# Patient Record
Sex: MALE | Race: WHITE | Employment: OTHER | URBAN - METROPOLITAN AREA
[De-identification: names, ages, dates, MRNs, and addresses within clinical notes are randomized per-mention and may not be internally consistent; named-entity substitution may affect disease eponyms.]

---

## 2022-03-06 ENCOUNTER — HOSPITAL ENCOUNTER (INPATIENT)
Age: 82
LOS: 9 days | Discharge: HOME HEALTH | DRG: 189 | End: 2022-03-15
Attending: EMERGENCY MEDICINE | Admitting: INTERNAL MEDICINE
Payer: MEDICARE

## 2022-03-06 ENCOUNTER — APPOINTMENT (OUTPATIENT)
Dept: GENERAL RADIOLOGY | Age: 82
DRG: 189 | End: 2022-03-06
Attending: EMERGENCY MEDICINE
Payer: MEDICARE

## 2022-03-06 ENCOUNTER — APPOINTMENT (OUTPATIENT)
Dept: CT IMAGING | Age: 82
DRG: 189 | End: 2022-03-06
Attending: EMERGENCY MEDICINE
Payer: MEDICARE

## 2022-03-06 DIAGNOSIS — R41.0 DELIRIUM: ICD-10-CM

## 2022-03-06 DIAGNOSIS — D72.829 LEUKOCYTOSIS, UNSPECIFIED TYPE: ICD-10-CM

## 2022-03-06 DIAGNOSIS — Z51.5 PALLIATIVE CARE BY SPECIALIST: ICD-10-CM

## 2022-03-06 DIAGNOSIS — R06.02 SHORTNESS OF BREATH: ICD-10-CM

## 2022-03-06 DIAGNOSIS — N17.9 AKI (ACUTE KIDNEY INJURY) (HCC): Primary | ICD-10-CM

## 2022-03-06 DIAGNOSIS — Z71.89 ADVANCE CARE PLANNING: ICD-10-CM

## 2022-03-06 PROBLEM — J96.01 ACUTE RESPIRATORY FAILURE WITH HYPOXIA (HCC): Status: ACTIVE | Noted: 2022-03-06

## 2022-03-06 PROBLEM — R65.10 SIRS (SYSTEMIC INFLAMMATORY RESPONSE SYNDROME) (HCC): Status: ACTIVE | Noted: 2022-03-06

## 2022-03-06 LAB
ALBUMIN SERPL-MCNC: 3.2 G/DL (ref 3.5–5)
ALBUMIN/GLOB SERPL: 0.6 {RATIO} (ref 1.1–2.2)
ALP SERPL-CCNC: 116 U/L (ref 45–117)
ALT SERPL-CCNC: 28 U/L (ref 12–78)
ANION GAP SERPL CALC-SCNC: ABNORMAL MMOL/L (ref 5–15)
APPEARANCE UR: CLEAR
AST SERPL-CCNC: 15 U/L (ref 15–37)
BACTERIA URNS QL MICRO: NEGATIVE /HPF
BASOPHILS # BLD: 0.1 K/UL (ref 0–0.1)
BASOPHILS NFR BLD: 0 % (ref 0–1)
BILIRUB SERPL-MCNC: 0.3 MG/DL (ref 0.2–1)
BILIRUB UR QL: NEGATIVE
BUN SERPL-MCNC: 29 MG/DL (ref 6–20)
BUN/CREAT SERPL: 21 (ref 12–20)
CALCIUM SERPL-MCNC: 9.9 MG/DL (ref 8.5–10.1)
CHLORIDE SERPL-SCNC: 101 MMOL/L (ref 97–108)
CO2 SERPL-SCNC: 37 MMOL/L (ref 21–32)
COLOR UR: ABNORMAL
COMMENT, HOLDF: NORMAL
COVID-19 RAPID TEST, COVR: NOT DETECTED
CREAT SERPL-MCNC: 1.38 MG/DL (ref 0.7–1.3)
DIFFERENTIAL METHOD BLD: ABNORMAL
EOSINOPHIL # BLD: 0.3 K/UL (ref 0–0.4)
EOSINOPHIL NFR BLD: 2 % (ref 0–7)
EPITH CASTS URNS QL MICRO: ABNORMAL /LPF
ERYTHROCYTE [DISTWIDTH] IN BLOOD BY AUTOMATED COUNT: 13.1 % (ref 11.5–14.5)
FLUAV AG NPH QL IA: NEGATIVE
FLUBV AG NOSE QL IA: NEGATIVE
GLOBULIN SER CALC-MCNC: 5.1 G/DL (ref 2–4)
GLUCOSE SERPL-MCNC: 142 MG/DL (ref 65–100)
GLUCOSE UR STRIP.AUTO-MCNC: NEGATIVE MG/DL
HCT VFR BLD AUTO: 45.8 % (ref 36.6–50.3)
HGB BLD-MCNC: 14.1 G/DL (ref 12.1–17)
HGB UR QL STRIP: NEGATIVE
IMM GRANULOCYTES # BLD AUTO: 0.1 K/UL (ref 0–0.04)
IMM GRANULOCYTES NFR BLD AUTO: 1 % (ref 0–0.5)
KETONES UR QL STRIP.AUTO: NEGATIVE MG/DL
LACTATE SERPL-SCNC: 0.8 MMOL/L (ref 0.4–2)
LEUKOCYTE ESTERASE UR QL STRIP.AUTO: ABNORMAL
LYMPHOCYTES # BLD: 1.8 K/UL (ref 0.8–3.5)
LYMPHOCYTES NFR BLD: 11 % (ref 12–49)
MCH RBC QN AUTO: 30.7 PG (ref 26–34)
MCHC RBC AUTO-ENTMCNC: 30.8 G/DL (ref 30–36.5)
MCV RBC AUTO: 99.6 FL (ref 80–99)
MONOCYTES # BLD: 1.4 K/UL (ref 0–1)
MONOCYTES NFR BLD: 9 % (ref 5–13)
NEUTS SEG # BLD: 12.9 K/UL (ref 1.8–8)
NEUTS SEG NFR BLD: 77 % (ref 32–75)
NITRITE UR QL STRIP.AUTO: NEGATIVE
NRBC # BLD: 0 K/UL (ref 0–0.01)
NRBC BLD-RTO: 0 PER 100 WBC
PH UR STRIP: 5 [PH] (ref 5–8)
PLATELET # BLD AUTO: 299 K/UL (ref 150–400)
PMV BLD AUTO: 9.3 FL (ref 8.9–12.9)
POTASSIUM SERPL-SCNC: 5 MMOL/L (ref 3.5–5.1)
PROCALCITONIN SERPL-MCNC: 0.19 NG/ML
PROT SERPL-MCNC: 8.3 G/DL (ref 6.4–8.2)
PROT UR STRIP-MCNC: 30 MG/DL
RBC # BLD AUTO: 4.6 M/UL (ref 4.1–5.7)
RBC #/AREA URNS HPF: ABNORMAL /HPF (ref 0–5)
SAMPLES BEING HELD,HOLD: NORMAL
SODIUM SERPL-SCNC: 137 MMOL/L (ref 136–145)
SOURCE, COVRS: NORMAL
SP GR UR REFRACTOMETRY: 1.03 (ref 1–1.03)
SPERM URNS QL MICRO: PRESENT
TROPONIN-HIGH SENSITIVITY: 10 NG/L (ref 0–76)
UR CULT HOLD, URHOLD: NORMAL
UROBILINOGEN UR QL STRIP.AUTO: 0.2 EU/DL (ref 0.2–1)
WBC # BLD AUTO: 16.5 K/UL (ref 4.1–11.1)
WBC URNS QL MICRO: ABNORMAL /HPF (ref 0–4)

## 2022-03-06 PROCEDURE — 87804 INFLUENZA ASSAY W/OPTIC: CPT

## 2022-03-06 PROCEDURE — 84484 ASSAY OF TROPONIN QUANT: CPT

## 2022-03-06 PROCEDURE — 99285 EMERGENCY DEPT VISIT HI MDM: CPT

## 2022-03-06 PROCEDURE — 71045 X-RAY EXAM CHEST 1 VIEW: CPT

## 2022-03-06 PROCEDURE — 87635 SARS-COV-2 COVID-19 AMP PRB: CPT

## 2022-03-06 PROCEDURE — 93005 ELECTROCARDIOGRAM TRACING: CPT

## 2022-03-06 PROCEDURE — 80053 COMPREHEN METABOLIC PANEL: CPT

## 2022-03-06 PROCEDURE — 74011250636 HC RX REV CODE- 250/636: Performed by: INTERNAL MEDICINE

## 2022-03-06 PROCEDURE — 83605 ASSAY OF LACTIC ACID: CPT

## 2022-03-06 PROCEDURE — 65270000029 HC RM PRIVATE

## 2022-03-06 PROCEDURE — 74011250637 HC RX REV CODE- 250/637: Performed by: FAMILY MEDICINE

## 2022-03-06 PROCEDURE — 81001 URINALYSIS AUTO W/SCOPE: CPT

## 2022-03-06 PROCEDURE — 36415 COLL VENOUS BLD VENIPUNCTURE: CPT

## 2022-03-06 PROCEDURE — 74011000250 HC RX REV CODE- 250: Performed by: INTERNAL MEDICINE

## 2022-03-06 PROCEDURE — 70450 CT HEAD/BRAIN W/O DYE: CPT

## 2022-03-06 PROCEDURE — 87040 BLOOD CULTURE FOR BACTERIA: CPT

## 2022-03-06 PROCEDURE — 85025 COMPLETE CBC W/AUTO DIFF WBC: CPT

## 2022-03-06 PROCEDURE — 84145 PROCALCITONIN (PCT): CPT

## 2022-03-06 RX ORDER — ALBUTEROL SULFATE 90 UG/1
2 AEROSOL, METERED RESPIRATORY (INHALATION)
COMMUNITY

## 2022-03-06 RX ORDER — AMLODIPINE BESYLATE 10 MG/1
10 TABLET ORAL
COMMUNITY

## 2022-03-06 RX ORDER — POLYETHYLENE GLYCOL 3350 17 G/17G
17 POWDER, FOR SOLUTION ORAL DAILY PRN
Status: DISCONTINUED | OUTPATIENT
Start: 2022-03-06 | End: 2022-03-15 | Stop reason: HOSPADM

## 2022-03-06 RX ORDER — SIMVASTATIN 20 MG/1
20 TABLET, FILM COATED ORAL
COMMUNITY

## 2022-03-06 RX ORDER — LEVOCETIRIZINE DIHYDROCHLORIDE 5 MG/1
5 TABLET, FILM COATED ORAL
COMMUNITY

## 2022-03-06 RX ORDER — SODIUM CHLORIDE 0.9 % (FLUSH) 0.9 %
5-40 SYRINGE (ML) INJECTION EVERY 8 HOURS
Status: DISCONTINUED | OUTPATIENT
Start: 2022-03-06 | End: 2022-03-15 | Stop reason: HOSPADM

## 2022-03-06 RX ORDER — SODIUM CHLORIDE 0.9 % (FLUSH) 0.9 %
5-40 SYRINGE (ML) INJECTION AS NEEDED
Status: DISCONTINUED | OUTPATIENT
Start: 2022-03-06 | End: 2022-03-15 | Stop reason: HOSPADM

## 2022-03-06 RX ORDER — HEPARIN SODIUM 5000 [USP'U]/ML
5000 INJECTION, SOLUTION INTRAVENOUS; SUBCUTANEOUS EVERY 8 HOURS
Status: DISCONTINUED | OUTPATIENT
Start: 2022-03-06 | End: 2022-03-15 | Stop reason: HOSPADM

## 2022-03-06 RX ORDER — BENAZEPRIL HYDROCHLORIDE 20 MG/1
20 TABLET ORAL DAILY
COMMUNITY
End: 2022-03-15

## 2022-03-06 RX ORDER — DICLOFENAC SODIUM 75 MG/1
75 TABLET, DELAYED RELEASE ORAL
COMMUNITY
End: 2022-03-15

## 2022-03-06 RX ORDER — ONDANSETRON 2 MG/ML
4 INJECTION INTRAMUSCULAR; INTRAVENOUS
Status: DISCONTINUED | OUTPATIENT
Start: 2022-03-06 | End: 2022-03-15 | Stop reason: HOSPADM

## 2022-03-06 RX ORDER — GABAPENTIN 300 MG/1
300 CAPSULE ORAL 2 TIMES DAILY
COMMUNITY

## 2022-03-06 RX ORDER — ACETAMINOPHEN 650 MG/1
650 SUPPOSITORY RECTAL
Status: DISCONTINUED | OUTPATIENT
Start: 2022-03-06 | End: 2022-03-15 | Stop reason: HOSPADM

## 2022-03-06 RX ORDER — ACETAMINOPHEN 325 MG/1
650 TABLET ORAL
Status: DISCONTINUED | OUTPATIENT
Start: 2022-03-06 | End: 2022-03-15 | Stop reason: HOSPADM

## 2022-03-06 RX ORDER — LANOLIN ALCOHOL/MO/W.PET/CERES
3 CREAM (GRAM) TOPICAL
Status: DISCONTINUED | OUTPATIENT
Start: 2022-03-06 | End: 2022-03-15 | Stop reason: HOSPADM

## 2022-03-06 RX ORDER — ONDANSETRON 4 MG/1
4 TABLET, ORALLY DISINTEGRATING ORAL
Status: DISCONTINUED | OUTPATIENT
Start: 2022-03-06 | End: 2022-03-15 | Stop reason: HOSPADM

## 2022-03-06 RX ADMIN — Medication 3 MG: at 23:11

## 2022-03-06 RX ADMIN — HEPARIN SODIUM 5000 UNITS: 5000 INJECTION INTRAVENOUS; SUBCUTANEOUS at 22:14

## 2022-03-06 RX ADMIN — SODIUM CHLORIDE, PRESERVATIVE FREE 10 ML: 5 INJECTION INTRAVENOUS at 22:14

## 2022-03-06 NOTE — H&P
Barrera Wu [facility] 79  [address]  [phone]    Admission History and Physical      NAME:  Sandy Medeiros   :   1940   MRN:  [id number]     PCP:  No primary care provider on file. Date/Time of service:  3/6/2022  6:20 PM        Subjective:     CHIEF COMPLAINT: ataxia, confusion     HISTORY OF PRESENT ILLNESS:     Mr. Renetta Schreiber is a 80 y.o. male who is admitted with confusion, multiple falls earlier today. Wife and son also present during encounter. [facility], states last xray outpatient no change from prior. Today noted to be hypoxic on second call to fire department when family insisted he come to hospital (he refused after first call to fire department). His O2 sats drop into 70s despite 6L NC in place during encounter, improve with deep inspiration and breaks from talking. He normally wears 3-4L usually in the morning but not all day. He initially insists on leaving. I discussed at length in presence of his family the risks of him going home AMA. After long discussion with myself and family, he is agreeable to stay. No Known Allergies    Prior to Admission medications    Not on File       Past Medical History: HTN, GERD     No past surgical history on file. Social History     Tobacco Use    Smoking status: Not on file    Smokeless tobacco: Not on file   Substance Use Topics    Alcohol use: Not on file        No family history on file.      Review of Systems:  (bold if positive, if negative)    Gen:  Eyes:  ENT:  CVS:  Pulm:  dyspneaGI:  :  MS:  Skin:  Psych:  Endo:  Hem:  Renal:  Neuro:          Objective:      VITALS:    Vital signs reviewed; most recent are:    Visit Vitals  BP (!) 146/78 (BP 1 Location: Right upper arm, BP Patient Position: At rest)   Pulse (!) 109   Temp 99.1 °F (37.3 °C)   Resp 29   Ht 6' 1\" (1.854 m)   Wt 102.7 kg (226 lb 8 oz)   SpO2 94%   BMI 29.88 kg/m²     SpO2 Readings from Last 6 Encounters:   03/06/22 94%    O2 Flow Rate (L/min): 5 l/min   No intake or output data in the 24 hours ending 03/06/22 1820     Exam:     Physical Exam:    Gen:  Well-developed, well-nourished, in no acute distress  HEENT:  Pink conjunctivae, PERRL, hearing intact to voice, moist mucous membranes  Neck:  Supple, without masses, thyroid non-tender  Resp:  No accessory muscle use, clear breath sounds without wheezes rales or rhonchi  Card:  No murmurs, normal S1, S2 without thrills, bruits or peripheral edema  Abd:  Soft, non-tender, non-distended, normoactive bowel sounds are present, no palpable organomegaly and +LLQ hernia  Lymph:  No cervical adenopathy  Musc:  No cyanosis or clubbing  Skin:  No rashes or ulcers, skin turgor is good  Neuro:  Nonfocal, moves extremities, follows commands  Psych:  Alert with good insight. Oriented to person, place, and time      Labs:    Recent Labs     03/06/22  1638   WBC 16.5*   HGB 14.1   HCT 45.8        Recent Labs     03/06/22  1638      K 5.0      CO2 37*   *   BUN 29*   CREA 1.38*   CA 9.9   ALB 3.2*   TBILI 0.3   ALT 28          Assessment/Plan:       1. Acute metabolic encephalopathy - CT head without acute abnormality; resolved suspect sec to hypoxia  2. Acute hypoxic respiratory failure - cxr with R hemidiaphragm elevation, no prior imaging for comparison; c/s pulm due to known L collapsed lung, ?progression; c/s speech to r/o risk of aspiration  3. Ataxia - PT/OT, check vit b12, folate, TSH  4. Elevated creatinine - no baseline for comparison, IVF, trend; suspect OJ sec to dehydration  5. Dehydration - spec grav 1.029 on ua, IVF  6. SIRS - covid, flu neg; leukocytosis, tachycardic, tachypnic; cxr neg for pna, check ucx; poss reactive from hypoxia, bcx pending, lactic acid and procal wnl  7. Chronic hernia  8. HTN  9. GERD  10. Overweight - BMI 29.88  11. VTE prophylaxis - heparin  12.  Dispo - admit to remote telemetry    Partial code status order placed in [facility]. OK for chest compressions, medications. Patient declines intubation if necessary.     Attending Physician: Nakul Maldonado,

## 2022-03-06 NOTE — ED TRIAGE NOTES
Pt arrives to ED via EMS from home where family reports pt has been more confused today. Per EMS pt has GCS 15 with periodic confusion. Pt also found to be satting at 79% on baseline 4 LO2 due to left side collapsed lung. Pt placed on 6L O2, satting at 91%. Pt non ambulatory.

## 2022-03-06 NOTE — ED PROVIDER NOTES
Sri Ramirez is a 81 yo M who arrives to the ED by EMS for confusion and decreased O2 saturation. EMS reports that patient has a history of left side collapsed lung and is on 4 L home O2 at baseline. Was called earlier today for lift assist and then this afternoon for increased confusion. Was found to have O2 saturation 79% on 4L on their arrival.  They increased to 6L and O2 sat now 91%. Patient is nonambulatory at baseline. Patient denies chest pain. No past medical history on file. No past surgical history on file. No family history on file. Social History     Socioeconomic History    Marital status: Not on file     Spouse name: Not on file    Number of children: Not on file    Years of education: Not on file    Highest education level: Not on file   Occupational History    Not on file   Tobacco Use    Smoking status: Not on file    Smokeless tobacco: Not on file   Substance and Sexual Activity    Alcohol use: Not on file    Drug use: Not on file    Sexual activity: Not on file   Other Topics Concern    Not on file   Social History Narrative    Not on file     Social Determinants of Health     Financial Resource Strain:     Difficulty of Paying Living Expenses: Not on file   Food Insecurity:     Worried About Running Out of Food in the Last Year: Not on file    Mary of Food in the Last Year: Not on file   Transportation Needs:     Lack of Transportation (Medical): Not on file    Lack of Transportation (Non-Medical):  Not on file   Physical Activity:     Days of Exercise per Week: Not on file    Minutes of Exercise per Session: Not on file   Stress:     Feeling of Stress : Not on file   Social Connections:     Frequency of Communication with Friends and Family: Not on file    Frequency of Social Gatherings with Friends and Family: Not on file    Attends Advent Services: Not on file    Active Member of Clubs or Organizations: Not on file    Attends Club or Organization Meetings: Not on file    Marital Status: Not on file   Intimate Partner Violence:     Fear of Current or Ex-Partner: Not on file    Emotionally Abused: Not on file    Physically Abused: Not on file    Sexually Abused: Not on file   Housing Stability:     Unable to Pay for Housing in the Last Year: Not on file    Number of [place] in the Last Year: Not on file    Unstable Housing in the Last Year: Not on file         ALLERGIES: Patient has no allergy information on record. Review of Systems   Unable to perform ROS: Mental status change       Vitals:    03/06/22 1626 03/06/22 1634   BP: (!) 146/78    Pulse: (!) 109    Resp: 29    Temp: 99.1 °F (37.3 °C)    SpO2: 95% 94%   Weight: 102.7 kg (226 lb 8 oz)    Height: 6' 1\" (1.854 m)             Physical Exam  Vitals and nursing note reviewed. Constitutional:       General: He is not in acute distress. Appearance: He is well-developed. HENT:      Head: Normocephalic and atraumatic. Eyes:      Conjunctiva/sclera: Conjunctivae normal.   Neck:      Trachea: Phonation normal.   Cardiovascular:      Rate and Rhythm: Normal rate. Pulmonary:      Effort: Pulmonary effort is normal. No respiratory distress. Breath sounds: Rales present. Abdominal:      General: There is no distension. Musculoskeletal:         General: No tenderness. Normal range of motion. Cervical back: Normal range of motion. Skin:     General: Skin is warm and dry. Neurological:      Mental Status: He is alert. He is not disoriented. Motor: No abnormal muscle tone. ED EKG interpretation:  Rhythm: sinus tachycardia; and regular . Rate (approx.): 108; Axis: normal; P wave: normal; QRS interval: normal ; ST/T wave: normal; Other findings: otherwise normal. This EKG was interpreted by Thomas Fowler MD,ED Provider.     MDM       Perfect Serve Consult for Admission  6:12 PM    ED Room Number: ER16/16  Patient Name and age:  Maria M Simon y.o.  male  Working Diagnosis:   1. OJ (acute kidney injury) ([facility] [address]    2. Leukocytosis, unspecified type    3. Delirium        COVID-19 Suspicion:  no  Sepsis present:  no  Reassessment needed: N/A  Code Status:  Full Code  Readmission: no  Isolation Requirements:  no  Recommended Level of Care:  telemetry  Department:[facility] - [phone]  Other:  Patient brought by EMS. Family report increased confusion and ataxia today. [employer] twice and EMS was called to home earlier for lift assist and patient refused transport at that time. .  On second visit O2 sat in low 80's and patient more confused. Is on 4 L O2 at home. O2 sat no 94%. CXR with right diaphragm elevation but no acute findings. CT head with no acute findings.     Procedures

## 2022-03-07 LAB
ANION GAP SERPL CALC-SCNC: 3 MMOL/L (ref 5–15)
ATRIAL RATE: 108 BPM
BASOPHILS # BLD: 0.1 K/UL (ref 0–0.1)
BASOPHILS NFR BLD: 0 % (ref 0–1)
BUN SERPL-MCNC: 19 MG/DL (ref 6–20)
BUN/CREAT SERPL: 19 (ref 12–20)
CALCIUM SERPL-MCNC: 9.8 MG/DL (ref 8.5–10.1)
CALCULATED P AXIS, ECG09: 64 DEGREES
CALCULATED R AXIS, ECG10: 43 DEGREES
CALCULATED T AXIS, ECG11: 65 DEGREES
CHLORIDE SERPL-SCNC: 103 MMOL/L (ref 97–108)
CO2 SERPL-SCNC: 31 MMOL/L (ref 21–32)
CREAT SERPL-MCNC: 0.98 MG/DL (ref 0.7–1.3)
DIAGNOSIS, 93000: NORMAL
DIFFERENTIAL METHOD BLD: ABNORMAL
EOSINOPHIL # BLD: 0.2 K/UL (ref 0–0.4)
EOSINOPHIL NFR BLD: 1 % (ref 0–7)
ERYTHROCYTE [DISTWIDTH] IN BLOOD BY AUTOMATED COUNT: 13.2 % (ref 11.5–14.5)
FOLATE SERPL-MCNC: 55.2 NG/ML (ref 5–21)
GLUCOSE SERPL-MCNC: 141 MG/DL (ref 65–100)
HCT VFR BLD AUTO: 45.7 % (ref 36.6–50.3)
HGB BLD-MCNC: 14.3 G/DL (ref 12.1–17)
IMM GRANULOCYTES # BLD AUTO: 0.1 K/UL (ref 0–0.04)
IMM GRANULOCYTES NFR BLD AUTO: 0 % (ref 0–0.5)
LYMPHOCYTES # BLD: 1.8 K/UL (ref 0.8–3.5)
LYMPHOCYTES NFR BLD: 11 % (ref 12–49)
MCH RBC QN AUTO: 30.9 PG (ref 26–34)
MCHC RBC AUTO-ENTMCNC: 31.3 G/DL (ref 30–36.5)
MCV RBC AUTO: 98.7 FL (ref 80–99)
MONOCYTES # BLD: 1.2 K/UL (ref 0–1)
MONOCYTES NFR BLD: 8 % (ref 5–13)
NEUTS SEG # BLD: 12.3 K/UL (ref 1.8–8)
NEUTS SEG NFR BLD: 80 % (ref 32–75)
NRBC # BLD: 0 K/UL (ref 0–0.01)
NRBC BLD-RTO: 0 PER 100 WBC
P-R INTERVAL, ECG05: 138 MS
PLATELET # BLD AUTO: 263 K/UL (ref 150–400)
PMV BLD AUTO: 9.3 FL (ref 8.9–12.9)
POTASSIUM SERPL-SCNC: 4.8 MMOL/L (ref 3.5–5.1)
Q-T INTERVAL, ECG07: 314 MS
QRS DURATION, ECG06: 92 MS
QTC CALCULATION (BEZET), ECG08: 420 MS
RBC # BLD AUTO: 4.63 M/UL (ref 4.1–5.7)
SODIUM SERPL-SCNC: 137 MMOL/L (ref 136–145)
TSH SERPL DL<=0.05 MIU/L-ACNC: 0.6 UIU/ML (ref 0.36–3.74)
VENTRICULAR RATE, ECG03: 108 BPM
VIT B12 SERPL-MCNC: 1375 PG/ML (ref 193–986)
WBC # BLD AUTO: 15.5 K/UL (ref 4.1–11.1)

## 2022-03-07 PROCEDURE — 74011250637 HC RX REV CODE- 250/637: Performed by: INTERNAL MEDICINE

## 2022-03-07 PROCEDURE — 74011250636 HC RX REV CODE- 250/636: Performed by: INTERNAL MEDICINE

## 2022-03-07 PROCEDURE — 74011250637 HC RX REV CODE- 250/637: Performed by: FAMILY MEDICINE

## 2022-03-07 PROCEDURE — 97162 PT EVAL MOD COMPLEX 30 MIN: CPT

## 2022-03-07 PROCEDURE — 84443 ASSAY THYROID STIM HORMONE: CPT

## 2022-03-07 PROCEDURE — 97530 THERAPEUTIC ACTIVITIES: CPT

## 2022-03-07 PROCEDURE — 94761 N-INVAS EAR/PLS OXIMETRY MLT: CPT

## 2022-03-07 PROCEDURE — 94640 AIRWAY INHALATION TREATMENT: CPT

## 2022-03-07 PROCEDURE — 92610 EVALUATE SWALLOWING FUNCTION: CPT

## 2022-03-07 PROCEDURE — 94664 DEMO&/EVAL PT USE INHALER: CPT

## 2022-03-07 PROCEDURE — 97116 GAIT TRAINING THERAPY: CPT

## 2022-03-07 PROCEDURE — 74011000250 HC RX REV CODE- 250: Performed by: INTERNAL MEDICINE

## 2022-03-07 PROCEDURE — 65660000000 HC RM CCU STEPDOWN

## 2022-03-07 PROCEDURE — 36415 COLL VENOUS BLD VENIPUNCTURE: CPT

## 2022-03-07 PROCEDURE — 97535 SELF CARE MNGMENT TRAINING: CPT

## 2022-03-07 PROCEDURE — 77010033711 HC HIGH FLOW OXYGEN

## 2022-03-07 PROCEDURE — 82607 VITAMIN B-12: CPT

## 2022-03-07 PROCEDURE — 80048 BASIC METABOLIC PNL TOTAL CA: CPT

## 2022-03-07 PROCEDURE — 85025 COMPLETE CBC W/AUTO DIFF WBC: CPT

## 2022-03-07 PROCEDURE — 77010033678 HC OXYGEN DAILY

## 2022-03-07 PROCEDURE — 82746 ASSAY OF FOLIC ACID SERUM: CPT

## 2022-03-07 PROCEDURE — 2709999900 HC NON-CHARGEABLE SUPPLY

## 2022-03-07 PROCEDURE — 74011636637 HC RX REV CODE- 636/637: Performed by: INTERNAL MEDICINE

## 2022-03-07 PROCEDURE — 97165 OT EVAL LOW COMPLEX 30 MIN: CPT

## 2022-03-07 RX ORDER — IPRATROPIUM BROMIDE AND ALBUTEROL SULFATE 2.5; .5 MG/3ML; MG/3ML
3 SOLUTION RESPIRATORY (INHALATION)
Status: DISCONTINUED | OUTPATIENT
Start: 2022-03-07 | End: 2022-03-08

## 2022-03-07 RX ORDER — HYDRALAZINE HYDROCHLORIDE 20 MG/ML
10 INJECTION INTRAMUSCULAR; INTRAVENOUS
Status: DISCONTINUED | OUTPATIENT
Start: 2022-03-07 | End: 2022-03-15 | Stop reason: HOSPADM

## 2022-03-07 RX ORDER — OXYCODONE HYDROCHLORIDE 5 MG/1
5 TABLET ORAL
Status: DISCONTINUED | OUTPATIENT
Start: 2022-03-07 | End: 2022-03-15 | Stop reason: HOSPADM

## 2022-03-07 RX ORDER — GUAIFENESIN 600 MG/1
600 TABLET, EXTENDED RELEASE ORAL EVERY 12 HOURS
Status: DISCONTINUED | OUTPATIENT
Start: 2022-03-07 | End: 2022-03-15 | Stop reason: HOSPADM

## 2022-03-07 RX ORDER — IPRATROPIUM BROMIDE AND ALBUTEROL SULFATE 2.5; .5 MG/3ML; MG/3ML
3 SOLUTION RESPIRATORY (INHALATION)
Status: DISCONTINUED | OUTPATIENT
Start: 2022-03-07 | End: 2022-03-15 | Stop reason: HOSPADM

## 2022-03-07 RX ORDER — PREDNISONE 20 MG/1
40 TABLET ORAL
Status: COMPLETED | OUTPATIENT
Start: 2022-03-07 | End: 2022-03-10

## 2022-03-07 RX ADMIN — OXYCODONE 5 MG: 5 TABLET ORAL at 19:54

## 2022-03-07 RX ADMIN — HEPARIN SODIUM 5000 UNITS: 5000 INJECTION INTRAVENOUS; SUBCUTANEOUS at 21:55

## 2022-03-07 RX ADMIN — SODIUM CHLORIDE, PRESERVATIVE FREE 10 ML: 5 INJECTION INTRAVENOUS at 08:05

## 2022-03-07 RX ADMIN — SODIUM CHLORIDE, PRESERVATIVE FREE 10 ML: 5 INJECTION INTRAVENOUS at 21:38

## 2022-03-07 RX ADMIN — ONDANSETRON 4 MG: 2 INJECTION INTRAMUSCULAR; INTRAVENOUS at 21:37

## 2022-03-07 RX ADMIN — SODIUM CHLORIDE 1 G: 9 INJECTION INTRAMUSCULAR; INTRAVENOUS; SUBCUTANEOUS at 16:58

## 2022-03-07 RX ADMIN — IPRATROPIUM BROMIDE AND ALBUTEROL SULFATE 3 ML: .5; 3 SOLUTION RESPIRATORY (INHALATION) at 21:14

## 2022-03-07 RX ADMIN — Medication 3 MG: at 21:55

## 2022-03-07 RX ADMIN — ACETAMINOPHEN 650 MG: 325 TABLET ORAL at 00:26

## 2022-03-07 RX ADMIN — HEPARIN SODIUM 5000 UNITS: 5000 INJECTION INTRAVENOUS; SUBCUTANEOUS at 08:05

## 2022-03-07 RX ADMIN — SODIUM CHLORIDE, PRESERVATIVE FREE 10 ML: 5 INJECTION INTRAVENOUS at 13:22

## 2022-03-07 RX ADMIN — IPRATROPIUM BROMIDE AND ALBUTEROL SULFATE 3 ML: .5; 3 SOLUTION RESPIRATORY (INHALATION) at 15:47

## 2022-03-07 RX ADMIN — PREDNISONE 40 MG: 20 TABLET ORAL at 16:58

## 2022-03-07 RX ADMIN — GUAIFENESIN 600 MG: 600 TABLET ORAL at 16:58

## 2022-03-07 RX ADMIN — HEPARIN SODIUM 5000 UNITS: 5000 INJECTION INTRAVENOUS; SUBCUTANEOUS at 13:21

## 2022-03-07 NOTE — ED NOTES
Patient resting in bed on right side trying to go to sleep, 02 sat decreasing into the 80\"s increase oxygen via nasal cannula to 7 liters and then 8 liters.

## 2022-03-07 NOTE — PROGRESS NOTES
Reason for Admission:  Acute respiratory failure with hypoxia, SIRS                   RUR Score:    8%                 Plan for utilizing home health:    TBD - per PT/OT eval/recs      PCP: First and Last name:  Senia Guzman   Name of Practice: [address] [place] [address]   Are you a current patient: Yes/No: yes   Approximate date of last visit: 3 weeks ago   Can you participate in a virtual visit with your PCP: yes                                    Current Advanced Directive/Advance Care Plan: No Order    Healthcare Decision Maker:              Primary Decision Maker: Efrain - [phone]                  Transition of Care Plan:                    CM met with patient to begin discharge planning. Patient lives with his wife in a two story townhouse with no entry steps and a stair lift to the second floor where patient's bed and full bath are located. Patient has no prior home health or rehab history. He reports that he is independent with self care, uses home O2 provided by [facility]i [facility] (3-4L at baseline) and has a cane and a CPAP. Patient reportedly drives and uses his cane when he ambulates. He thinks he would benefit from having a rolling walker. PT/OT will evaluate for d/c needs. [facility]'s [facility] [address] [facility] CM provided patient with information on profectus health research. 1. CM to follow and assist with d/c needs  2. Await further evaluation  3. PT/OT/SLP consulted  4. Pulmonary following  5. Home with family and HH if indicated  6. Outpatient follow-up  7. Wife will transport home at discharge    Care Management Interventions  PCP Verified by CM:  Yes  Physical Therapy Consult: Yes  Occupational Therapy Consult: Yes  Speech Therapy Consult: Yes  Support Systems: Spouse/Significant Other  Confirm Follow Up Transport: Family  The Plan for Transition of Care is Related to the Following Treatment Goals : Acute respiratory failure with hypoxia, SIRS  Discharge Location  Patient Expects to be Discharged to[de-identified] Home with family assistance     Olga Garcia

## 2022-03-07 NOTE — PROGRESS NOTES
As the RRT RN, spoke with [address] [place] [address], primary RN d/t MEWS score of 3 d/t tachycardia and BP. No issues at this time, asked to call if any concerns or questions.

## 2022-03-07 NOTE — PROGRESS NOTES
Spiritual Care Assessment/Progress Note  [address]      NAME: Kaylynn Solano      MRN: [id number]  AGE: 80 y.o.  SEX: male  Latter-day Affiliation:    Language: English     3/7/2022     Total Time (in minutes): 20     Spiritual Assessment begun in [facility]  MED SURG 2 through conversation with:         [x]Patient        [x] Family    [] Friend(s)        Reason for Consult: Palliative Care, Initial/Spiritual Assessment     Spiritual beliefs: (Please include comment if needed)     [] Identifies with a renee tradition:         [] Supported by a renee community:            [x] Claims no spiritual orientation:           [] Seeking spiritual identity:                [] Adheres to an individual form of spirituality:           [] Not able to assess:                           Identified resources for coping:      [] Prayer                               [] Music                  [] Guided Imagery     [x] Family/friends                 [] Pet visits     [] Devotional reading                         [] Unknown     [] Other:                                               Interventions offered during this visit: (See comments for more details)    Patient Interventions: Affirmation of emotions/emotional suffering,Affirmation of renee,Catharsis/review of pertinent events in supportive environment,Normalization of emotional/spiritual concerns           Plan of Care:     [] Support spiritual and/or cultural needs    [] Support AMD and/or advance care planning process      [] Support grieving process   [] Coordinate Rites and/or Rituals    [] Coordination with community clergy   [] No spiritual needs identified at this time   [] Detailed Plan of Care below (See Comments)  [] Make referral to Music Therapy  [] Make referral to Pet Therapy     [] Make referral to Addiction services  [] Make referral to [facility]  [] Make referral to Spiritual Care Partner  [] No future visits requested        [x] Follow up visits as needed Visited patient for palliative initial spiritual assessment. Patient's wife Caitlyn Peace was at bedside. The couple have been  some 48 years, have two children and multiple grand & greatgrand children. They live independently and hope to remain so. He reports feeling better this morning and both expressed appreciation for the care he has received thus far.    Chaplain James, MDiv, MS, [facility]

## 2022-03-07 NOTE — ED NOTES
Report given to [facility], RN, verbalized understanding, no questions at this time.

## 2022-03-07 NOTE — PROGRESS NOTES
Problem: Self Care Deficits Care Plan (Adult)  Goal: *Acute Goals and Plan of Care (Insert Text)  Description: FUNCTIONAL STATUS PRIOR TO ADMISSION: Patient was modified independent for functional mobility, and ADLs. HOME SUPPORT: The patient lived with spouse, and with recent increased need for assist with tasks. Occupational Therapy Goals  Initiated 3/7/2022  1. Patient will perform lower body dressing with supervision/set-up within 7 day(s). 2.  Patient will perform bathing with supervision/set-up within 7 day(s). 3.  Patient will perform toilet transfers with minimal assistance/contact guard assist within 7 day(s). 4.  Patient will perform all aspects of toileting with minimal assistance/contact guard assist within 7 day(s). 5.  Patient will participate in upper extremity therapeutic exercise/activities with supervision/set-up for 10 minutes within 7 day(s). 3/7/2022 1218 by Carlos Todd OT  Outcome: Progressing Towards Goal    OCCUPATIONAL THERAPY EVALUATION  Patient: Diann Allen (80 y.o. male)  Date: 3/7/2022  Primary Diagnosis: Acute respiratory failure with hypoxia ([facility]) [J96.01]  SIRS (systemic inflammatory response syndrome) ([facility]) [R65.10]        Precautions:   Fall    ASSESSMENT  Based on the objective data described below, the patient presents with hospital admission secondary to acute respiratory failure with hypoxia. Patient reports symptoms of confusion,  and falls. Patient received in bed, agreeable to activity. He reports 4L O2 baseline but now on 8L. Patient sats reading 93-94% at rest.  Patient to EOB with SBA, but reports LEs feel as though they will give way with standing. Mod assist x 2 to stand and take side steps to chair set up next to bed. Patient with poor control for sitting despite reaching for armrests to lower self to chair. With additional practice for sit to stand he is able to improve technique.   Patient with difficulty using urinal/ BSC in sitting as patient with very large hernia. Able to stand at [facility] using BSC bucket  to void. At completion of task back left in NAD in chair, wife present in room. Current Level of Function Impacting Discharge (ADLs/self-care): mod x 2 mobility    Functional Outcome Measure: The patient scored 55/100 on the Barthel INDex  outcome measure. Other factors to consider for discharge: lives with spouse     Patient will benefit from skilled therapy intervention to address the above noted impairments. PLAN :  Recommendations and Planned Interventions: self care training, functional mobility training, therapeutic exercise, balance training, therapeutic activities, endurance activities, patient education, home safety training, and family training/education    Frequency/Duration: Patient will be followed by occupational therapy 5 times a week to address goals. Recommendation for discharge: (in order for the patient to meet his/her long term goals)  Occupational therapy at least 2 days/week in the home AND ensure assist and/or supervision for safety with ADLs and transfers   Per patient request  This discharge recommendation:  Has not yet been discussed the attending provider and/or case management    IF patient discharges home will need the following DME: TBD       SUBJECTIVE:   Patient stated I need to try to use the bathroom.     OBJECTIVE DATA SUMMARY:   HISTORY:   No past medical history on file. No past surgical history on file.     Expanded or extensive additional review of patient history:     Home Situation  Home Environment: Private residence ([facility])  # Steps to Enter: 0  One/Two Story Residence: Two story  Lift Chair Available: Yes  Living Alone: No  Support Systems: Spouse/Significant Other  Patient Expects to be Discharged to[de-identified] Home with family assistance  Current DME Used/Available at Home: Jhonny Highman, rollator,Cane, straight  Tub or Shower Type: Shower    Hand dominance: Right    EXAMINATION OF PERFORMANCE DEFICITS:  Cognitive/Behavioral Status:  Neurologic State: Alert  Orientation Level: Oriented X4  Cognition: Follows commands  Perception: Appears intact  Perseveration: No perseveration noted       Skin: intact as seen    Edema: none noted     Hearing:       Vision/Perceptual:                                     Range of Motion:  AROM: (P) Within functional limits                         Strength:  Strength: (P) Generally decreased, functional                Coordination:  Coordination: (P) Within functional limits  Fine Motor Skills-Upper: Left Intact; Right Intact    Gross Motor Skills-Upper: Left Intact; Right Intact    Tone & Sensation:  Tone: (P) Normal  Sensation: (P) Intact                      Balance:  Sitting: Intact  Standing: Impaired; With support  Standing - Static: Constant support  Standing - Dynamic : Constant support    Functional Mobility and Transfers for ADLs:  Bed Mobility:  Rolling: (P) Modified independent  Supine to Sit: Stand-by assistance  Scooting: (P) Modified independent    Transfers:  Sit to Stand: Moderate assistance;Assist x2  Stand to Sit: (P) Moderate assistance;Assist x2 (poor eccentric quads)  Bed to Chair: Moderate assistance;Assist x2  Toilet Transfer : Moderate assistance;Assist x2  Assistive Device : Walker, rolling    ADL Assessment:  Feeding: Independent    Oral Facial Hygiene/Grooming: Setup (seated )    Bathing: Minimum assistance (sitting)    Upper Body Dressing: Minimum assistance    Lower Body Dressing: Moderate assistance    Toileting: Moderate assistance                ADL Intervention and task modifications:                                          Therapeutic Exercise:     Functional Measure:    Barthel Index:  Bathin  Bladder: 10  Bowels: 10  Groomin  Dressin  Feeding: 10  Mobility: 0  Stairs: 0  Toilet Use: 5  Transfer (Bed to Chair and Back): 10  Total: 55/100      The Barthel ADL Index: Guidelines  1.  The index should be used as a record of what a patient does, not as a record of what a patient could do. 2. The main aim is to establish degree of independence from any help, physical or verbal, however minor and for whatever reason. 3. The need for supervision renders the patient not independent. 4. A patient's performance should be established using the best available evidence. Asking the patient, friends/relatives and nurses are the usual sources, but direct observation and common sense are also important. However direct testing is not needed. 5. Usually the patient's performance over the preceding 24-48 hours is important, but occasionally longer periods will be relevant. 6. Middle categories imply that the patient supplies over 50 per cent of the effort. 7. Use of aids to be independent is allowed. Score Interpretation (from [facility])    Independent   60-79 Minimally independent   40-59 Partially dependent   20-39 Very dependent   <20 Totally dependent     -Christine Kirkpatrick., BarthelCHRISS. (1965). Functional evaluation: the Barthel Index. [address] [facility] ([address] (1997). The Barthel activities of daily living index: self-reporting versus actual performance in the old (> or = 75 years). Journal of [address] [place] [address] 457), [facility], J.TURNERF, Marty Soto., Elaine Campos. (1999). Measuring the change in disability after inpatient rehabilitation; comparison of the responsiveness of the Barthel Index and Functional [place] Measure. Journal of Neurology, Neurosurgery, and Psychiatry, 66(4), 036-947. Maria Isabel Antonio, N.J.A, Kingston Neal  WMOY.SERENA, & Lissette Reed, M.A. (2004) Assessment of post-stroke quality of life in cost-effectiveness studies: The usefulness of the Barthel Index and the EuroQoL-5D.  Quality of Life Research, 15, 235-07         Occupational Therapy Evaluation Charge Determination   History Examination Decision-Making   LOW Complexity : Brief history review  LOW Complexity : 1-3 performance deficits relating to physical, cognitive , or psychosocial skils that result in activity limitations and / or participation restrictions  LOW Complexity : No comorbidities that affect functional and no verbal or physical assistance needed to complete eval tasks       Based on the above components, the patient evaluation is determined to be of the following complexity level: LOW   Pain Rating:      Activity Tolerance:   desaturates with exertion and requires oxygen, requires rest breaks, and observed SOB with activity    After treatment patient left in no apparent distress:    Sitting in chair, Call bell within reach, Bed / chair alarm activated, and Caregiver / family present    COMMUNICATION/EDUCATION:   The patients plan of care was discussed with: Physical therapist and Registered nurse. Home safety education was provided and the patient/caregiver indicated understanding., Patient/family have participated as able in goal setting and plan of care. , and Patient/family agree to work toward stated goals and plan of care. This patients plan of care is appropriate for delegation to [facility].     Thank you for this referral.  Josue Thorpe, OTR/L  Time Calculation: 35 mins

## 2022-03-07 NOTE — ACP (ADVANCE CARE PLANNING)
Advance Care Planning     Advance Care Planning (ACP) Physician/NP/PA Conversation      Date of Conversation: 3/6/2022  Conducted with: Patient with Decision Making Capacity    Care Preferences:    Hospitalization: \"If your health worsens and it becomes clear that your chance of recovery is unlikely, what would be your preference regarding hospitalization? \"  The patient would prefer hospitalization. Ventilation: \"If you were unable to breathe on your own and your chance of recovery was unlikely, what would be your preference about the use of a ventilator (breathing machine) if it was available to you? \"   The patient would NOT desire the use of a ventilator. Resuscitation: \"In the event your heart stopped as a result of an underlying serious health condition, would you want attempts to be made to restart your heart, or would you prefer a natural death? \"   Yes, attempt to resuscitate. Additional topics discussed: treatment goals, ventilation preferences, hospitalization preferences, resuscitation preferences and hospice care    Conversation Outcomes / Follow-Up Plan:   Reviewed DNR/DNI and patient declines intubation but agreeable to chest compressions and medications.     Length of Voluntary ACP Conversation in minutes:  16 minutes    Therese Allred DO Bedside shift change report given to Roya Membreno RN (oncoming nurse) by Tani Dorado RN (offgoing nurse). Report included the following information SBAR, Kardex, MAR and Recent Results.

## 2022-03-07 NOTE — PROGRESS NOTES
TRANSFER - IN REPORT:    Verbal report received from VALENTIN TURNER RN(name) on Noemi Bone  being received from 522(unit) for routine progression of care      Report consisted of patients Situation, Background, Assessment and   Recommendations(SBAR). Information from the following report(s) SBAR, Kardex, Intake/Output, MAR, Accordion and Recent Results was reviewed with the receiving nurse. Opportunity for questions and clarification was provided. Assessment completed upon patients arrival to unit and care assumed. 1900 - Pt received on nonrebreather. Called RT for midflow setup. Stand to completed once patient on midflow. Bedside and Verbal shift change report given to Nancy Caballero RN (oncoming nurse) by Kendall Chung RN (offgoing nurse). Report included the following information SBAR, Kardex, Intake/Output, MAR, Accordion and Recent Results.

## 2022-03-07 NOTE — CONSULTS
Name: [facility], [place]: [address]   : 1940 Admit Date: 3/6/2022   Phone:   Room: 522/01   PCP: Renetta Membreno MD  MRN: [id number]   Date: 3/7/2022  Code: No Order          Chart and notes reviewed. Data reviewed. I review the patient's current medications in the medical record at each encounter. I have evaluated and examined the patient. HPI:    8:33 AM       History was obtained from patient. I was asked by Bailey Marcelino DO to see Sarah Bhandari in consultation for a chief complaint of acute on chronic respiratory failure and elevated R hemidiaphragm. History of Present Illness:  Mr. Jose Ramon Wolff is a 81 yo gentleman with a history of COPD (moderately severe obstruction, FEV1 1.60L, 52% in 2019), chronic respiratory failure with hypoxia, SVEN (noncompliant with CPAP), and elevated R hemidiaphragm who is admitted for ataxia and confusion. He is followed by both the Pulmonary and Sleep Divisions of [facility]. He was last seen in the Pulmonary clinic by Dr. Trish Velazquez in 2020. He was initially referred to us for R hemidiaphragm elevation that was discovered in 2019. He denies known trauma or surgeries. He had PFTs that showed moderately severe obstruction and was found to be hypoxic as well as to have SVEN. He did not note benefit from long acting inhalers and did not continue them. He did not tolerated his CPAP and was not wearing O2 as he felt he did not need/tolerate it. He did not follow-up with pulmonary as planned. He did see sleep as recently as 2022 and at that time O2 had been restarted by his PCP, but he was not willing try his CPAP again at that time. He comes in with confusion and falls. He sats were in the 70's on 6L initially and has required up to 8L to maintain his sats (is on 2-3L at home).      Labs reviewed: WBC 15.5, Hgb 14.3, , HCO2 31 (initially 37), cr 0.98,     Images reviewed:  CXR 3/6/2022 with chronic R hemidiaphragm elevation and associated atelectasis      Social History     Tobacco Use    Smoking status: Not on file    Smokeless tobacco: Not on file   Substance Use Topics    Alcohol use: Not on file       No Known Allergies    Current Facility-Administered Medications   Medication Dose Route Frequency    sodium chloride (NS) flush 5-40 mL  5-40 mL IntraVENous Q8H    sodium chloride (NS) flush 5-40 mL  5-40 mL IntraVENous PRN    acetaminophen (TYLENOL) tablet 650 mg  650 mg Oral Q6H PRN    Or    acetaminophen (TYLENOL) suppository 650 mg  650 mg Rectal Q6H PRN    polyethylene glycol (MIRALAX) packet 17 g  17 g Oral DAILY PRN    ondansetron (ZOFRAN ODT) tablet 4 mg  4 mg Oral Q8H PRN    Or    ondansetron (ZOFRAN) injection 4 mg  4 mg IntraVENous Q6H PRN    heparin (porcine) injection 5,000 Units  5,000 Units SubCUTAneous Q8H    melatonin tablet 3 mg  3 mg Oral QHS         REVIEW OF SYSTEMS   12 point ROS negative except as stated in the HPI. Physical Exam:   Visit Vitals  BP (!) 143/74   Pulse (!) 107   Temp 98.3 °F (36.8 °C)   Resp 22   Ht 6' 1\" (1.854 m)   Wt 102.7 kg (226 lb 8 oz)   SpO2 92%   BMI 29.88 kg/m²       General:  Alert, cooperative, no distress, appears stated age. Head:  Normocephalic, without obvious abnormality, atraumatic. Eyes:  Conjunctivae/corneas clear. Nose: Nares normal. Septum midline   Throat: Lips, mucosa, and tongue normal.    Neck: Supple, symmetrical, trachea midline   Lungs:   Clear to auscultation bilaterally. Chest wall:  No tenderness or deformity. Heart:  Regular rate and rhythm, S1, S2 normal, no murmur, click, rub or gallop. Abdomen:   Soft, non-tender. Bowel sounds normal.   Extremities: Extremities normal, atraumatic, no cyanosis or edema. Pulses: 2+ and symmetric all extremities.    Skin: Skin color, texture, turgor normal. No rashes or lesions       Neurologic: Grossly nonfocal       Lab Results   Component Value Date/Time    Sodium 137 03/07/2022 05:04 AM    Potassium 4.8 03/07/2022 05:04 AM    Chloride 103 03/07/2022 05:04 AM    CO2 31 03/07/2022 05:04 AM    BUN 19 03/07/2022 05:04 AM    Creatinine 0.98 03/07/2022 05:04 AM    Glucose 141 (H) 03/07/2022 05:04 AM    Calcium 9.8 03/07/2022 05:04 AM    Lactic acid 0.8 03/06/2022 04:38 PM       Lab Results   Component Value Date/Time    WBC 15.5 (H) 03/07/2022 05:04 AM    HGB 14.3 03/07/2022 05:04 AM    PLATELET 450 23/17/4321 05:04 AM    MCV 98.7 03/07/2022 05:04 AM       Lab Results   Component Value Date/Time    Alk.  phosphatase 116 03/06/2022 04:38 PM    Protein, total 8.3 (H) 03/06/2022 04:38 PM    Albumin 3.2 (L) 03/06/2022 04:38 PM    Globulin 5.1 (H) 03/06/2022 04:38 PM       No results found for: IRON, FE, TIBC, IBCT, PSAT, FERR    Lab Results   Component Value Date/Time    TSH 0.60 03/07/2022 05:04 AM        No results found for: PH, PHI, PCO2, PCO2I, PO2, PO2I, HCO3, HCO3I, FIO2, FIO2I    No results found for: CPK, RCK1, RCK2, RCK3, RCK4, CKNDX, CKND1, TROPT, TROIQ, BNPP, BNP     Lab Results   Component Value Date/Time    Culture result: NO GROWTH AFTER 13 HOURS 03/06/2022 04:38 PM       No results found for: TOXA1, RPR, HBCM, HBSAG, HAAB, HCAB1, HAAT, G6PD, CRYAC, HIVGT, HIVR, HIV1, HIV12, HIVPC, HIVRPI    No results found for: VANCT, CPK    Lab Results   Component Value Date/Time    Color YELLOW/STRAW 03/06/2022 05:41 PM    Appearance CLEAR 03/06/2022 05:41 PM    pH (UA) 5.0 03/06/2022 05:41 PM    Protein 30 (A) 03/06/2022 05:41 PM    Glucose Negative 03/06/2022 05:41 PM    Ketone Negative 03/06/2022 05:41 PM    Bilirubin Negative 03/06/2022 05:41 PM    Blood Negative 03/06/2022 05:41 PM    Urobilinogen 0.2 03/06/2022 05:41 PM    Nitrites Negative 03/06/2022 05:41 PM    Leukocyte Esterase TRACE (A) 03/06/2022 05:41 PM    WBC 10-20 03/06/2022 05:41 PM    RBC 5-10 03/06/2022 05:41 PM    Bacteria Negative 03/06/2022 05:41 PM       IMPRESSION  · Acute on chronic respiratory failure with hypoxia  · SVEN, noncompliant with CPAP  · COPD, does not appear to be in exacerbation  · Chronic R hemidiaphragm elevation    PLAN  · Goal sats 88% or higher, wean O2 as tolerated   · Discussed with him that not wearing CPAP is contributing to his chronic hypoxia and that we will likely have difficultly weaning O2 if he is not wearing this. He is willing to try wearing CPAP while here as he is currently on a level of O2 that is not appropriate for discharge. · PRN duonebs, would benefit from a trying additional long acting inhalers as an outpatient  · COPD does not appear to currently be in exacerbation, but would have a low threshold to add steroids if symptoms worsen  · Mental status appears improved, but would obtain an ABG if this worsens again      Thank you for allowing us to participate in the care of this patient. We will be happy to follow along in his/her progress with you.     Jose L Canas MD

## 2022-03-07 NOTE — PROGRESS NOTES
SPEECH PATHOLOGY BEDSIDE SWALLOW EVALUATION/DISCHARGE  Patient: Maeve Fuentes (80 y.o. male)  Date: 3/7/2022  Primary Diagnosis: Acute respiratory failure with hypoxia ([facility] [address] [J96.01]  SIRS (systemic inflammatory response syndrome) ([facility]) [R65.10]       Precautions:        ASSESSMENT :  Based on the objective data described below, the patient presents with [facility] oral/pharyngeal swallow, and no overt s/s aspiration observed. Note patient admitted with hypoxia and encephalopathy, however patient now Ox4 and on [facility]. Patient denied history of dysphagia, and RN also reported diet tolerance. CXR showed No acute findings with note of right diaphragmatic elevation with overlying atelectasis. Skilled acute therapy provided by a speech-language pathologist is not indicated at this time. PLAN :  Recommendations:  -Regular/thin liquid diet  Discharge Recommendations: None     SUBJECTIVE:   Patient Ox4. OBJECTIVE:   No past medical history on file. No past surgical history on file. Prior Level of Function/Home Situation:   Home Situation  Support Systems: Spouse/Significant Other  Patient Expects to be Discharged to[de-identified] Home with family assistance  Diet prior to admission: regular/thin  Current Diet:  Regular/thin   Cognitive and Communication Status:  Neurologic State: Alert  Orientation Level: Oriented X4  Cognition: Follows commands           Oral Assessment:  Oral Assessment  Labial: No impairment  Dentition: Natural  Oral Hygiene: moist oral mucosa  Lingual: No impairment  Velum: No impairment  Mandible: No impairment  P.O. Trials:  Patient Position: sitting EOB  Vocal quality prior to P.O.: No impairment  Consistency Presented: Ice chips; Thin liquid; Solid  How Presented: Self-fed/presented;Cup/gulp;Straw;Successive swallows     Bolus Acceptance: No impairment  Bolus Formation/Control: No impairment     Propulsion: No impairment  Oral Residue: None  Initiation of Swallow: No impairment  Laryngeal Elevation: Functional  Aspiration Signs/Symptoms: None  Pharyngeal Phase Characteristics: No impairment, issues, or problems   Effective Modifications: None  Cues for Modifications: None       Oral Phase Severity: No impairment  Pharyngeal Phase Severity : No impairment  NOMS:   The NOMS functional outcome measure was used to quantify this patient's level of swallowing impairment. Based on the NOMS, the patient was determined to be at level 7 for swallow function     NOMS Swallowing Levels:  Level 1 (CN): NPO  Level 2 (CM): NPO but takes consistency in therapy  Level 3 (CL): Takes less than 50% of nutrition p.o. and continues with nonoral feedings; and/or safe with mod cues; and/or max diet restriction  Level 4 (CK): Safe swallow but needs mod cues; and/or mod diet restriction; and/or still requires some nonoral feeding/supplements  Level 5 (CJ): Safe swallow with min diet restriction; and/or needs min cues  Level 6 (CI): Independent with p.o.; rare cues; usually self cues; may need to avoid some foods or needs extra time  Level 7 ([address] [place] [address]): Independent for all p.o.  LYUBOV. (2003). National Outcomes Measurement System (NOMS): Adult Speech-Language Pathology User's Guide. Pain:  Pain Scale 1: Numeric (0 - 10)  Pain Intensity 1: 2  Pain Location 1: Abdomen  After treatment:   Patient left in no apparent distress in bed, Call bell within reach, Nursing notified and Caregiver / family present    COMMUNICATION/EDUCATION:   Patient was educated regarding his deficit(s) of WFL swallow as this relates to his diagnosis. He demonstrated Good understanding as evidenced by verbalizing understanding. The patient's plan of care including recommendations, planned interventions, and recommended diet changes were discussed with: Registered nurse.      Thank you for this referral.  ÁNGEL Reynoso  Time Calculation: 10 mins

## 2022-03-07 NOTE — PROGRESS NOTES
Admission Medication Reconciliation:     Information obtained from:    Family member at the desk. Pill bottles reviewed. The patient had AMS and was unable to complete the interview. RxQuery data available¹:  YES    Comments/Recommendations:   Patient's family is able to confirm name, , allergies, and preferred pharmacy  Updated PTA medication list       ¹RxQuery pharmacy benefit data reflects medications filled and processed through the patient's insurance, however   this data does NOT capture whether the medication was picked up or is currently being taken by the patient. Prior to Admission Medications   Prescriptions Last Dose Informant Taking? albuterol (PROVENTIL HFA, VENTOLIN HFA, PROAIR HFA) 90 mcg/actuation inhaler  Self Yes   Sig: Take 2 Puffs by inhalation every four (4) hours as needed for Wheezing. amLODIPine (NORVASC) 10 mg tablet 3/5/2022 at Unknown time Self Yes   Sig: Take 10 mg by mouth nightly. benazepriL (LOTENSIN) 20 mg tablet 3/6/2022 at am Self Yes   Sig: Take 20 mg by mouth daily. diclofenac EC (VOLTAREN) 75 mg EC tablet  Self Yes   Sig: Take 75 mg by mouth two (2) times daily as needed for Pain.   gabapentin (NEURONTIN) 300 mg capsule 3/6/2022 at am Self Yes   Sig: Take 300 mg by mouth two (2) times a day. levocetirizine (XYZAL) 5 mg tablet 3/5/2022 at Unknown time Self Yes   Sig: Take 5 mg by mouth nightly. simvastatin (ZOCOR) 20 mg tablet 3/5/2022 at Unknown time Self Yes   Sig: Take 20 mg by mouth nightly. Facility-Administered Medications: None         Please contact the main inpatient pharmacy with any questions or concerns at [phone] and we will direct you to the clinical pharmacist covering this patient's care while in-house.    Birgit Butler, Eliana, BCPS

## 2022-03-07 NOTE — ED NOTES
Patient O2 saturation decreasing to low 70's, Dr India Salguero at bedside, Respiratory called and at bedside at this time. Placing patient on midflow.

## 2022-03-07 NOTE — H&P
[facility]  Hospitalist Group                                                                                          Hospitalist Progress Note  Krystyna Wall MD  Answering service: [phone] from in house phone        Date of Service:  3/7/2022  NAME:  Trevor Linder  :  1940  MRN:  [id number]      Admission Summary:   \"Mr. Nilda Andrade is a 80 y.o. male who is admitted with confusion, multiple falls earlier today. Wife and son also present during encounter. [facility], states last xray outpatient no change from prior. Today noted to be hypoxic on second call to fire department when family insisted he come to hospital (he refused after first call to fire department). His O2 sats drop into 70s despite 6L NC in place during encounter, improve with deep inspiration and breaks from talking. He normally wears 3-4L usually in the morning but not all day. He initially insists on leaving. I discussed at length in presence of his family the risks of him going home AMA. After long discussion with myself and family, he is agreeable to stay. \"    Interval history / Subjective:   3/7/2022. Saw patient this morning, awake alert and oriented, no apparent distress, very anxious to leave the hospital however is cooperative and pleasant. Denies any fever, chills, nausea, vomiting. Still on 8 L high flow nasal cannula. Assessment & Plan:     Acute metabolic encephalopathy  Likely due to hypoxia and UTI. CT head negative for any acute abnormalities. Appears to be back to baseline as per family. Acute on chronic respiratory failure with hypoxia   Reporting noncompliance with CPAP, likely contributing to worsening respiratory failure. We will continue LAMA, LABA, ICS, as needed DuoNebs, supplemental oxygen. Flutter valve, incentive spirometry and pulmonary toileting. Obstructive sleep apnea  Nocturnal CPAP.     COPD   As above    OJ Likely prerenal due to dehydration, resolved. Monitor volume status and electrolytes, adjust electrolytes as needed. Dehydration   Continue cautious volume restriction guided by volume status. Systemic inflammatory response syndrome   Likely combination of UTI coupled with acute respiratory failure. Improving, continue empiric broad-spectrum IV antibiotics. Follow-up cultures. Code status: Full code  DVT prophylaxis: Lovenox    Care Plan discussed with: Patient/Family  Anticipated Disposition: Home w/Family  Anticipated Discharge: Less than 24 hours     Hospital Problems  Never Reviewed          Codes Class Noted POA    SIRS (systemic inflammatory response syndrome) ([facility] [address] ICD-10-CM: R65.10  ICD-9-CM: 995.90  3/6/2022 Unknown        Acute respiratory failure with hypoxia [facility]) ICD-10-CM: J96.01  ICD-9-CM: 518.81  3/6/2022 Unknown                Review of Systems:   A comprehensive review of systems was negative except for that written in the HPI. Vital Signs:    Last 24hrs VS reviewed since prior progress note. Most recent are:  Visit Vitals  BP (!) 123/95   Pulse (!) 110   Temp 98.1 °F (36.7 °C)   Resp 20   Ht 6' 1\" (1.854 m)   Wt 102.7 kg (226 lb 8 oz)   SpO2 93%   BMI 29.88 kg/m²         Intake/Output Summary (Last 24 hours) at 3/7/2022 1519  Last data filed at 3/7/2022 1440  Gross per 24 hour   Intake --   Output 150 ml   Net -150 ml        Physical Examination:     I had a face to face encounter with this patient and independently examined them on 3/7/2022 as outlined below:          Constitutional:  No acute distress, cooperative, pleasant    ENT:  Oral mucosa moist, oropharynx benign. Resp:  CTA bilaterally. No wheezing/rhonchi/rales. No accessory muscle use   CV:  Regular rhythm, normal rate, no murmurs, gallops, rubs    GI:  Soft, non distended, non tender.  normoactive bowel sounds, no hepatosplenomegaly     Musculoskeletal:  No edema, warm, 2+ pulses throughout    Neurologic:  Moves all extremities. AAOx3, CN II-XII reviewed            Data Review:    Review and/or order of clinical lab test      Labs:     Recent Labs     03/07/22  0504 03/06/22  1638   WBC 15.5* 16.5*   HGB 14.3 14.1   HCT 45.7 45.8    299     Recent Labs     03/07/22  0504 03/06/22  1638    137   K 4.8 5.0    101   CO2 31 37*   BUN 19 29*   CREA 0.98 1.38*   * 142*   CA 9.8 9.9     Recent Labs     03/06/22  1638   ALT 28      TBILI 0.3   TP 8.3*   ALB 3.2*   GLOB 5.1*     No results for input(s): INR, PTP, APTT, INREXT in the last 72 hours. No results for input(s): FE, TIBC, PSAT, FERR in the last 72 hours. Lab Results   Component Value Date/Time    Folate 55.2 (H) 03/07/2022 05:04 AM      No results for input(s): PH, PCO2, PO2 in the last 72 hours. No results for input(s): CPK, CKNDX, TROIQ in the last 72 hours.     No lab exists for component: CPKMB  No results found for: CHOL, CHOLX, CHLST, CHOLV, HDL, HDLP, LDL, LDLC, DLDLP, TGLX, TRIGL, TRIGP, CHHD, CHHDX  No results found for: [facility]  Lab Results   Component Value Date/Time    Color YELLOW/STRAW 03/06/2022 05:41 PM    Appearance CLEAR 03/06/2022 05:41 PM    Specific gravity 1.029 03/06/2022 05:41 PM    pH (UA) 5.0 03/06/2022 05:41 PM    Protein 30 (A) 03/06/2022 05:41 PM    Glucose Negative 03/06/2022 05:41 PM    Ketone Negative 03/06/2022 05:41 PM    Bilirubin Negative 03/06/2022 05:41 PM    Urobilinogen 0.2 03/06/2022 05:41 PM    Nitrites Negative 03/06/2022 05:41 PM    Leukocyte Esterase TRACE (A) 03/06/2022 05:41 PM    Epithelial cells FEW 03/06/2022 05:41 PM    Bacteria Negative 03/06/2022 05:41 PM    WBC 10-20 03/06/2022 05:41 PM    RBC 5-10 03/06/2022 05:41 PM         Medications Reviewed:     Current Facility-Administered Medications   Medication Dose Route Frequency    albuterol-ipratropium (DUO-NEB) 2.5 MG-0.5 MG/3 ML  3 mL Nebulization Q4H PRN    sodium chloride (NS) flush 5-40 mL  5-40 mL IntraVENous Q8H    sodium chloride (NS) flush 5-40 mL  5-40 mL IntraVENous PRN    acetaminophen (TYLENOL) tablet 650 mg  650 mg Oral Q6H PRN    Or    acetaminophen (TYLENOL) suppository 650 mg  650 mg Rectal Q6H PRN    polyethylene glycol (MIRALAX) packet 17 g  17 g Oral DAILY PRN    ondansetron (ZOFRAN ODT) tablet 4 mg  4 mg Oral Q8H PRN    Or    ondansetron (ZOFRAN) injection 4 mg  4 mg IntraVENous Q6H PRN    heparin (porcine) injection 5,000 Units  5,000 Units SubCUTAneous Q8H    melatonin tablet 3 mg  3 mg Oral QHS     ______________________________________________________________________  EXPECTED LENGTH OF STAY: - - -  ACTUAL LENGTH OF STAY:          1                 Daysi Chaudhari MD

## 2022-03-07 NOTE — PROGRESS NOTES
Problem: Mobility Impaired (Adult and Pediatric)  Goal: *Acute Goals and Plan of Care (Insert Text)  Description: FUNCTIONAL STATUS PRIOR TO ADMISSION: Patient was modified independent using a single point cane or rollator for functional mobility. Multiple falls in past year. 3-4 L home O2 during the day. HOME SUPPORT PRIOR TO ADMISSION: The patient lived with wife but did not require assist.  Called 911 with falls. Son in area    Physical Therapy Goals  Initiated 3/7/2022  1. Patient will move from supine to sit and sit to supine  in bed with modified independence within 7 day(s). 2.  Patient will transfer from bed to chair and chair to bed with minimal assistance/contact guard assist using the least restrictive device within 7 day(s). 3.  Patient will perform sit to stand with minimal assistance/contact guard assist within 7 day(s). 4.  Patient will ambulate with minimal assistance/contact guard assist for 25 feet with the least restrictive device within 7 day(s). Outcome: Progressing Towards Goal   PHYSICAL THERAPY EVALUATION  Patient: Dayo Moreno (80 y.o. male)  Date: 3/7/2022  Primary Diagnosis: Acute respiratory failure with hypoxia ([facility] [address] [J96.01]  SIRS (systemic inflammatory response syndrome) ([facility]) [R65.10]        Precautions:  Fall    ASSESSMENT  Based on the objective data described below, the patient presents with admission due acute resp failure with hypoxia and O2 sats of 70-79% and confusion. Pt received supine in bed on 8L high flow O2 with sats in the 90's. No SOB noted and A&Ox4. Pt supine to sit with SBA. Good sitting balance on EOB. VSS in sitting. Pt and wife stating issues with B knees buckling causing his falls at home as well as only using SPC vs RW. Sit to stand with Mod Ax2 with RW.  Gait of 3' to chair with same. Stood to use [facility] bucket to urinate vs urinal due to large inguinal hernia obstructing perineum. Pt able to stand and hold bucket with Mod to Roland 89. Placed in chair in NAD. O2 sats good on 8L holding in the mid to low 90's. Pt instructed to perform LAQ's x10 every hr in sitting to strengthen quads. Wife and pt requesting HHPT vs SNF for rehab. Current Level of Function Impacting Discharge (mobility/balance): Mod Ax2/fair    Functional Outcome Measure: The patient scored 55/100 on the barthel outcome measure     Other factors to consider for discharge: per above     Patient will benefit from skilled therapy intervention to address the above noted impairments. PLAN :  Recommendations and Planned Interventions: bed mobility training, transfer training, gait training, therapeutic exercises, neuromuscular re-education, edema management/control, patient and family training/education, and therapeutic activities      Frequency/Duration: Patient will be followed by physical therapy:  5 times a week to address goals. Recommendation for discharge: (in order for the patient to meet his/her long term goals)  Physical therapy at least 2 days/week in the home     This discharge recommendation:  Has been made in collaboration with the attending provider and/or case management    IF patient discharges home will need the following DME: to be determined (TBD)         SUBJECTIVE:   Patient stated My knees buckle sometimes.     OBJECTIVE DATA SUMMARY:   HISTORY:    No past medical history on file. No past surgical history on file.     Personal factors and/or comorbidities impacting plan of care: per above and below    Home Situation  Home Environment: Private residence ([facility])  # Steps to Enter: 0  One/Two Story Residence: Two story  Lift Chair Available: Yes  Living Alone: No  Support Systems: Spouse/Significant Other  Patient Expects to be Discharged to[de-identified] Home with family assistance  Current DME Used/Available at Home: Cleo Nutley, rollator,Cane, straight  Tub or Shower Type: Shower    EXAMINATION/PRESENTATION/DECISION MAKING:   Critical Behavior:  Neurologic State: Alert  Orientation Level: Oriented X4  Cognition: Follows commands     Hearing:     Skin:  IV  Edema: abdominal/inguinal hernia  Range Of Motion:  AROM: Within functional limits                       Strength:    Strength: Generally decreased, functional                    Tone & Sensation:   Tone: Normal              Sensation: Intact               Coordination:  Coordination: Within functional limits  Vision:      Functional Mobility:  Bed Mobility:  Rolling: Modified independent  Supine to Sit: Stand-by assistance     Scooting: Modified independent  Transfers:  Sit to Stand: Moderate assistance;Assist x2  Stand to Sit: Moderate assistance;Assist x2 (poor eccentric quads)        Bed to Chair: Moderate assistance;Assist x2              Balance:   Sitting: Intact  Standing: Impaired; With support  Standing - Static: Constant support  Standing - Dynamic : Constant support  Ambulation/Gait Training:  Distance (ft): 3 Feet (ft)  Assistive Device: Walker, rolling;Gait belt  Ambulation - Level of Assistance: Moderate assistance;Assist x2        Gait Abnormalities: Decreased step clearance (pt stating risk of knees buckling)        Base of Support: Narrowed     Speed/Amber: Slow;Pace decreased (<100 feet/min)  Step Length: Right shortened;Left shortened                       Therapeutic Exercises:   Per above    Functional Measure:  Barthel Index:    Bathin  Bladder: 10  Bowels: 10  Groomin  Dressin  Feeding: 10  Mobility: 0  Stairs: 0  Toilet Use: 5  Transfer (Bed to Chair and Back): 10  Total: 55/100       The Barthel ADL Index: Guidelines  1. The index should be used as a record of what a patient does, not as a record of what a patient could do. 2. The main aim is to establish degree of independence from any help, physical or verbal, however minor and for whatever reason. 3. The need for supervision renders the patient not independent.   4. A patient's performance should be established using the best available evidence. Asking the patient, friends/relatives and nurses are the usual sources, but direct observation and common sense are also important. However direct testing is not needed. 5. Usually the patient's performance over the preceding 24-48 hours is important, but occasionally longer periods will be relevant. 6. Middle categories imply that the patient supplies over 50 per cent of the effort. 7. Use of aids to be independent is allowed. Score Interpretation (from [address] [facility] [address])    Independent   60-79 Minimally independent   40-59 Partially dependent   20-39 Very dependent   <20 Totally dependent     -Christine Kirkpatrick., Barthel, D.W. (1965). Functional evaluation: the Barthel Index. [address] ([address] (1997). The Barthel activities of daily living index: self-reporting versus actual performance in the old (> or = 75 years). Journal of [address] [place] [address] 45(7), [address] [facility], ASIA, Jamari Mathias., Dora Munson. (1999). Measuring the change in disability after inpatient rehabilitation; comparison of the responsiveness of the Barthel Index and Functional [place] Measure. Journal of Neurology, Neurosurgery, and Psychiatry, 66(4), 303-744. Dotty Chacon, N.J.A, NEWTON Hale, & Madison Ashby MRedA. (2004) Assessment of post-stroke quality of life in cost-effectiveness studies: The usefulness of the Barthel Index and the EuroQoL-5D.  Quality of Life Research, 15, 214-04           Physical Therapy Evaluation Charge Determination   History Examination Presentation Decision-Making   MEDIUM  Complexity : 1-2 comorbidities / personal factors will impact the outcome/ POC  MEDIUM Complexity : 3 Standardized tests and measures addressing body structure, function, activity limitation and / or participation in recreation  MED Complexity  Other outcome measures barthel  MEDIUM      Based on the above components, the patient evaluation is determined to be of the following complexity level: MEDIUM    Pain Rating:  none    Activity Tolerance:   fair    After treatment patient left in no apparent distress:   Sitting in chair, Call bell within reach, Bed / chair alarm activated, and Caregiver / family present    COMMUNICATION/EDUCATION:   The patients plan of care was discussed with: Occupational therapist and Registered nurse. Fall prevention education was provided and the patient/caregiver indicated understanding., Patient/family have participated as able in goal setting and plan of care. , and Patient/family agree to work toward stated goals and plan of care.     Thank you for this referral.  Hipolito Carlos, PT   Time Calculation: 40 mins

## 2022-03-07 NOTE — ED NOTES
Patient's bed alarm ringing, found patient sitting on the end of the bed. Patient had voided in bed and on underwear and shorts. Patient had removed on monitoring equipment including oxygen. Patient assisted back into bed and cleaned. Patient denies any needs. Patient states \"my right ankle was hurting and I just wanted to sit up\".

## 2022-03-08 ENCOUNTER — APPOINTMENT (OUTPATIENT)
Dept: CT IMAGING | Age: 82
DRG: 189 | End: 2022-03-08
Attending: INTERNAL MEDICINE
Payer: MEDICARE

## 2022-03-08 LAB
ARTERIAL PATENCY WRIST A: POSITIVE
B PERT DNA SPEC QL NAA+PROBE: NOT DETECTED
BASE EXCESS BLD CALC-SCNC: 9 MMOL/L
BDY SITE: ABNORMAL
BNP SERPL-MCNC: 113 PG/ML
BORDETELLA PARAPERTUSSIS PCR, BORPAR: NOT DETECTED
C PNEUM DNA SPEC QL NAA+PROBE: NOT DETECTED
D DIMER PPP FEU-MCNC: 1.19 MG/L FEU (ref 0–0.65)
FLUAV H1 2009 PAND RNA SPEC QL NAA+PROBE: NOT DETECTED
FLUAV H1 RNA SPEC QL NAA+PROBE: NOT DETECTED
FLUAV H3 RNA SPEC QL NAA+PROBE: NOT DETECTED
FLUAV SUBTYP SPEC NAA+PROBE: NOT DETECTED
FLUBV RNA SPEC QL NAA+PROBE: NOT DETECTED
GAS FLOW.O2 O2 DELIVERY SYS: ABNORMAL L/MIN
HADV DNA SPEC QL NAA+PROBE: NOT DETECTED
HCO3 BLD-SCNC: 38.2 MMOL/L (ref 22–26)
HCOV 229E RNA SPEC QL NAA+PROBE: NOT DETECTED
HCOV HKU1 RNA SPEC QL NAA+PROBE: NOT DETECTED
HCOV NL63 RNA SPEC QL NAA+PROBE: NOT DETECTED
HCOV OC43 RNA SPEC QL NAA+PROBE: NOT DETECTED
HMPV RNA SPEC QL NAA+PROBE: NOT DETECTED
HPIV1 RNA SPEC QL NAA+PROBE: NOT DETECTED
HPIV2 RNA SPEC QL NAA+PROBE: NOT DETECTED
HPIV3 RNA SPEC QL NAA+PROBE: NOT DETECTED
HPIV4 RNA SPEC QL NAA+PROBE: NOT DETECTED
M PNEUMO DNA SPEC QL NAA+PROBE: NOT DETECTED
PCO2 BLD: 72.5 MMHG (ref 35–45)
PH BLD: 7.33 [PH] (ref 7.35–7.45)
PO2 BLD: 68 MMHG (ref 80–100)
RSV RNA SPEC QL NAA+PROBE: NOT DETECTED
RV+EV RNA SPEC QL NAA+PROBE: NOT DETECTED
SAO2 % BLD: 90.9 % (ref 92–97)
SARS-COV-2 PCR, COVPCR: NOT DETECTED
SPECIMEN TYPE: ABNORMAL

## 2022-03-08 PROCEDURE — 74011250637 HC RX REV CODE- 250/637: Performed by: FAMILY MEDICINE

## 2022-03-08 PROCEDURE — 97530 THERAPEUTIC ACTIVITIES: CPT

## 2022-03-08 PROCEDURE — 74011250637 HC RX REV CODE- 250/637: Performed by: INTERNAL MEDICINE

## 2022-03-08 PROCEDURE — 36415 COLL VENOUS BLD VENIPUNCTURE: CPT

## 2022-03-08 PROCEDURE — 94640 AIRWAY INHALATION TREATMENT: CPT

## 2022-03-08 PROCEDURE — 77010033711 HC HIGH FLOW OXYGEN

## 2022-03-08 PROCEDURE — 74011000636 HC RX REV CODE- 636: Performed by: RADIOLOGY

## 2022-03-08 PROCEDURE — 74011636637 HC RX REV CODE- 636/637: Performed by: INTERNAL MEDICINE

## 2022-03-08 PROCEDURE — 65660000000 HC RM CCU STEPDOWN

## 2022-03-08 PROCEDURE — 83880 ASSAY OF NATRIURETIC PEPTIDE: CPT

## 2022-03-08 PROCEDURE — 71275 CT ANGIOGRAPHY CHEST: CPT

## 2022-03-08 PROCEDURE — 74011000250 HC RX REV CODE- 250: Performed by: INTERNAL MEDICINE

## 2022-03-08 PROCEDURE — 74011250636 HC RX REV CODE- 250/636: Performed by: INTERNAL MEDICINE

## 2022-03-08 PROCEDURE — 36600 WITHDRAWAL OF ARTERIAL BLOOD: CPT

## 2022-03-08 PROCEDURE — 82803 BLOOD GASES ANY COMBINATION: CPT

## 2022-03-08 PROCEDURE — 85379 FIBRIN DEGRADATION QUANT: CPT

## 2022-03-08 PROCEDURE — 77010033678 HC OXYGEN DAILY

## 2022-03-08 PROCEDURE — 0202U NFCT DS 22 TRGT SARS-COV-2: CPT

## 2022-03-08 PROCEDURE — 97535 SELF CARE MNGMENT TRAINING: CPT

## 2022-03-08 PROCEDURE — 94761 N-INVAS EAR/PLS OXIMETRY MLT: CPT

## 2022-03-08 RX ORDER — ATORVASTATIN CALCIUM 10 MG/1
10 TABLET, FILM COATED ORAL
Status: DISCONTINUED | OUTPATIENT
Start: 2022-03-08 | End: 2022-03-15 | Stop reason: HOSPADM

## 2022-03-08 RX ORDER — AMLODIPINE BESYLATE 5 MG/1
10 TABLET ORAL
Status: DISCONTINUED | OUTPATIENT
Start: 2022-03-08 | End: 2022-03-15 | Stop reason: HOSPADM

## 2022-03-08 RX ORDER — GABAPENTIN 300 MG/1
300 CAPSULE ORAL 2 TIMES DAILY
Status: DISCONTINUED | OUTPATIENT
Start: 2022-03-08 | End: 2022-03-15 | Stop reason: HOSPADM

## 2022-03-08 RX ADMIN — ATORVASTATIN CALCIUM 10 MG: 10 TABLET, FILM COATED ORAL at 21:50

## 2022-03-08 RX ADMIN — AMLODIPINE BESYLATE 10 MG: 5 TABLET ORAL at 21:50

## 2022-03-08 RX ADMIN — HEPARIN SODIUM 5000 UNITS: 5000 INJECTION INTRAVENOUS; SUBCUTANEOUS at 21:49

## 2022-03-08 RX ADMIN — GABAPENTIN 300 MG: 300 CAPSULE ORAL at 08:41

## 2022-03-08 RX ADMIN — SODIUM CHLORIDE, PRESERVATIVE FREE 10 ML: 5 INJECTION INTRAVENOUS at 21:51

## 2022-03-08 RX ADMIN — PREDNISONE 40 MG: 20 TABLET ORAL at 08:41

## 2022-03-08 RX ADMIN — GUAIFENESIN 600 MG: 600 TABLET ORAL at 21:50

## 2022-03-08 RX ADMIN — SODIUM CHLORIDE, PRESERVATIVE FREE 10 ML: 5 INJECTION INTRAVENOUS at 14:28

## 2022-03-08 RX ADMIN — Medication 3 MG: at 21:50

## 2022-03-08 RX ADMIN — ACETAMINOPHEN 650 MG: 325 TABLET ORAL at 21:49

## 2022-03-08 RX ADMIN — IOPAMIDOL 100 ML: 755 INJECTION, SOLUTION INTRAVENOUS at 11:43

## 2022-03-08 RX ADMIN — SODIUM CHLORIDE, PRESERVATIVE FREE 10 ML: 5 INJECTION INTRAVENOUS at 05:53

## 2022-03-08 RX ADMIN — GUAIFENESIN 600 MG: 600 TABLET ORAL at 08:41

## 2022-03-08 RX ADMIN — SODIUM CHLORIDE 1 G: 9 INJECTION INTRAMUSCULAR; INTRAVENOUS; SUBCUTANEOUS at 16:14

## 2022-03-08 RX ADMIN — HEPARIN SODIUM 5000 UNITS: 5000 INJECTION INTRAVENOUS; SUBCUTANEOUS at 14:28

## 2022-03-08 RX ADMIN — ATORVASTATIN CALCIUM 10 MG: 10 TABLET, FILM COATED ORAL at 03:48

## 2022-03-08 RX ADMIN — GABAPENTIN 300 MG: 300 CAPSULE ORAL at 17:19

## 2022-03-08 RX ADMIN — IPRATROPIUM BROMIDE AND ALBUTEROL SULFATE 3 ML: .5; 3 SOLUTION RESPIRATORY (INHALATION) at 00:53

## 2022-03-08 RX ADMIN — AMLODIPINE BESYLATE 10 MG: 5 TABLET ORAL at 03:48

## 2022-03-08 RX ADMIN — HEPARIN SODIUM 5000 UNITS: 5000 INJECTION INTRAVENOUS; SUBCUTANEOUS at 05:53

## 2022-03-08 NOTE — PROGRESS NOTES
Nutrition Note    RD consulted for wounds. Per wound care note, no open areas - some redness noted on left dorsal great toe. No nutrition intervention is indicated at this time. Thank you.     Electronically signed by Danisha Carver RD on 9/0/9607   Contact: [phone] or via [employer]

## 2022-03-08 NOTE — PROGRESS NOTES
Problem: Self Care Deficits Care Plan (Adult)  Goal: *Acute Goals and Plan of Care (Insert Text)  Description: FUNCTIONAL STATUS PRIOR TO ADMISSION: Patient was modified independent for functional mobility, and ADLs. HOME SUPPORT: The patient lived with spouse, and with recent increased need for assist with tasks. Occupational Therapy Goals  Initiated 3/7/2022  1. Patient will perform lower body dressing with supervision/set-up within 7 day(s). 2.  Patient will perform bathing with supervision/set-up within 7 day(s). 3.  Patient will perform toilet transfers with minimal assistance/contact guard assist within 7 day(s). 4.  Patient will perform all aspects of toileting with minimal assistance/contact guard assist within 7 day(s). 5.  Patient will participate in upper extremity therapeutic exercise/activities with supervision/set-up for 10 minutes within 7 day(s). Outcome: Progressing Towards Goal   OCCUPATIONAL THERAPY TREATMENT  Patient: Yadira Perea (6 y.o. male)  Date: 3/8/2022  Diagnosis: Acute respiratory failure with hypoxia ([facility]) [J96.01]  SIRS (systemic inflammatory response syndrome) ([facility]) [R65.10] <principal problem not specified>       Precautions: Fall  Chart, occupational therapy assessment, plan of care, and goals were reviewed. ASSESSMENT  Patient continues with skilled OT services and is progressing towards goals. Mr. Gloria Dietrich was received in bed agreeable and motivated for activity. Patient's son and wife were at bedside and supportive throughout tx. Acknowledge patient had increased supplemental O2 needs overnight and required transfer to higher level of care. He is stable and cleared for OT interventions today. Patient was received on 4L O2 and tolerated activity on 3-4L O2 via NC with SpO2 ranging from 89-95% and HR ranging from 100-119 bpm with activity. Patient was able to come to sitting EOB with supervision, use of bed rails, and bed modified (HOB elevated).   Patient performed stand-pivot transfers with min to mod A; Up to mod A required from low surface, with fatigue, and with LOB with dynamic standing tasks. Education provided regarding improved transfer technique, RW use including hand placement, and direction with ADL transfers. Patient returned to bed at end of tx as transport arrived to take patient off the floor for testing; Increased assistance needed for LEs onto bed for return transfer. Provided extensive education to patient and family regarding rehab recommendation and benefits of rehab as it applies to patient's presentation today; They are considering and discussing further with CM. Patient would benefit from continued skilled OT to progress towards goals and improve overall independence. Current Level of Function Impacting Discharge (ADLs): Patient required supervision to min A for bed mobility and min to mod A for OOB transfers using rolling walker. Patient requires max A for LB dressing. PLAN :  Patient continues to benefit from skilled intervention to address the above impairments. Continue treatment per established plan of care to address goals. Recommend with staff:   Recommend patient be OOB to chair as frequently as tolerated; Goal of 3x/day for all meals for 60 minutes at a time. For toileting needs, recommend transfers to/from [facility] with staff assist.    Encourage patient involvement in personal care as able. Recommend next OT session: Continue towards set goals. Recommendation for discharge: (in order for the patient to meet his/her long term goals)  Therapy 3 hours per day 5-7 days per week- highly recommend; Patient and family are discussing and patient is considering. This discharge recommendation:  Has been made in collaboration with the attending provider and/or case management    IF patient discharges home will need the following DME: none       SUBJECTIVE:   Patient stated, \"I don't want to fall;  That scared me. re: fall at home PTA     OBJECTIVE DATA SUMMARY:   Cognitive/Behavioral Status:  Neurologic State: Alert  Orientation Level: Oriented X4  Cognition: Follows commands  Perception: Appears intact  Perseveration: No perseveration noted  Safety/Judgement: Awareness of environment    Functional Mobility and Transfers for ADLs:  Bed Mobility:  Rolling: Supervision  Supine to Sit: Supervision (HOB elevated + use of bed rails)  Sit to Supine: Minimum assistance;Assist x1;Additional time (assist for LEs onto bed)  Scooting: Minimum assistance;Assist x1;Additional time    Transfers:  Sit to Stand: Minimum assistance; Moderate assistance;Assist x1;Additional time  Functional Transfers  Toilet Transfer : Moderate assistance;Assist x1;Additional time  Bed to Chair: Moderate assistance;Assist x1;Minimum assistance; Additional time    Balance:  Sitting: Intact; High guard (high guard with dynamic sitting tasks)  Standing: Impaired  Standing - Static: Poor  Standing - Dynamic : Poor    ADL Intervention:  Lower Body Dressing Assistance  Dressing Assistance: Maximum assistance     Cognitive Retraining  Safety/Judgement: Awareness of environment    Activity Tolerance:   Good    After treatment patient left in no apparent distress:   Supine in bed, Call bell within reach, Bed / chair alarm activated, and Caregiver / family present    COMMUNICATION/COLLABORATION:   The patients plan of care was discussed with: Registered nurse, Physician, Case management, and patient, wife and son .      Emil Ceja OTR/L  Time Calculation: 55 mins

## 2022-03-08 NOTE — PROGRESS NOTES
1605:  [facility] unable to accept as they are out of network. A referral was sent in [address] to [facility] rehab.    1419:  Pt was accepted by [facility] for home health. He was agreeable to going to [facility]. A referral was sent in [address] to [facility]. Pt will need auth. Transition of Care Plan: RUR9%  1. CM following  2. CT today  3. PT/OT/SLP following; home health PT/OT recommended  4. Pulmonary following; now on 4L O2  5. A referral was sent in [address] to [facility]  6. Outpatient follow-up  7. Wife will transport home at discharge  JEIMY Ma

## 2022-03-08 NOTE — WOUND CARE
Wound Consult: consulted for redness to toes left foot POA. Patient resting on Independence bed; alert and oriented x 4. Going for CT. At bedside with Ines Lagos who noted redness on admission. Assessment:  Left dorsal great toe - mau pink area ~ 2 x 2 cm, Santhosh notes area not nearly as bright red as it was when she saw yesterday. No pain, no history of injury per patient. No redness to other toes or heels. No other concerns noted. Plan:  Please re-consult if area does not continue to fade or becomes painful/swollen and increases in redness.   Anabel Patton RN,CWON

## 2022-03-08 NOTE — PROGRESS NOTES
Increasing O2 requirements overnight. Did not wear CPAP as planned. Will order CTA and agree with steroids and abx. Formal note/assessment to follow.

## 2022-03-08 NOTE — PROGRESS NOTES
Nutrition Note    RD received call from unit requesting supplement.  RD placed order for Ensure High Protein once daily (160 kcal, 19 g carbs, 16 g protein)      Electronically signed by Iva Singh RD on 0/7/2122   Contact: [phone] or via [employer]

## 2022-03-08 NOTE — ROUTINE PROCESS
8478  Patient laying on his side. Oxygen at 85% on 6 L NC. Discussed with respiratory therapist.      6000  Patient states he feels nauseous and has a headache after taking the duo neb. Patient normally takes ventolin inhaler at home. Notified Dr. Mckenzie Corrigan. New orders received.    4768  Patient refused CPAP overnight.

## 2022-03-08 NOTE — PROGRESS NOTES
Name: [facility]: [address] [place] [address] Dr BREWER: 1940 Admit Date: 3/6/2022   Phone:   Room: 336/01   PCP: Daryle Buttner, [place]  MRN: [id number]   Date: 3/8/2022  Code: Full Code          Chart and notes reviewed. Data reviewed. I review the patient's current medications in the medical record at each encounter. I have evaluated and examined the patient. HPI:    8:33 AM       History was obtained from patient. I was asked by Therese Allred DO to see Christin Rao in consultation for a chief complaint of acute on chronic respiratory failure and elevated R hemidiaphragm. History of Present Illness:  Mr. Darcy Pradhan is a 79 yo gentleman with a history of COPD (moderately severe obstruction, FEV1 1.60L, 52% in 2019), chronic respiratory failure with hypoxia, SVEN (noncompliant with CPAP), and elevated R hemidiaphragm who is admitted for ataxia and confusion. He is followed by both the Pulmonary and Sleep Divisions of [facility]. He was last seen in the Pulmonary clinic by Dr. Santa Beard in 2020. He was initially referred to us for R hemidiaphragm elevation that was discovered in 2019. He denies known trauma or surgeries. He had PFTs that showed moderately severe obstruction and was found to be hypoxic as well as to have SVEN. He did not note benefit from long acting inhalers and did not continue them. He did not tolerated his CPAP and was not wearing O2 as he felt he did not need/tolerate it. He did not follow-up with pulmonary as planned. He did see sleep as recently as 2022 and at that time O2 had been restarted by his PCP, but he was not willing try his CPAP again at that time. He comes in with confusion and falls. He sats were in the 70's on 6L initially and has required up to 8L to maintain his sats (is on 2-3L at home).      Labs reviewed: WBC 15.5, Hgb 14.3, , HCO2 31 (initially 37), cr 0.98,     Images reviewed:  CXR 3/6/2022 with chronic R hemidiaphragm elevation and associated atelectasis    Interval history:    Afebrile  Bp soft  Sats 88% on 4L NC; up to 12L overnight  Did not wear CPAP overnight  3/7 WBC 15.5  Blood cultures negative x 2 days    ROS:  Reports feeling fine from a breathing standpoint. Denies symptoms. Discussed plan at bedside with family at length. Social History     Tobacco Use    Smoking status: Not on file    Smokeless tobacco: Not on file   Substance Use Topics    Alcohol use: Not on file       No Known Allergies    Current Facility-Administered Medications   Medication Dose Route Frequency    amLODIPine (NORVASC) tablet 10 mg  10 mg Oral QHS    gabapentin (NEURONTIN) capsule 300 mg  300 mg Oral BID    atorvastatin (LIPITOR) tablet 10 mg  10 mg Oral QHS    albuterol-ipratropium (DUO-NEB) 2.5 MG-0.5 MG/3 ML  3 mL Nebulization Q4H PRN    cefTRIAXone (ROCEPHIN) 1 g in 0.9% sodium chloride 10 mL IV syringe  1 g IntraVENous Q24H    predniSONE (DELTASONE) tablet 40 mg  40 mg Oral DAILY WITH BREAKFAST    hydrALAZINE (APRESOLINE) 20 mg/mL injection 10 mg  10 mg IntraVENous Q6H PRN    guaiFENesin ER (MUCINEX) tablet 600 mg  600 mg Oral Q12H    oxyCODONE IR (ROXICODONE) tablet 5 mg  5 mg Oral Q6H PRN    sodium chloride (NS) flush 5-40 mL  5-40 mL IntraVENous Q8H    sodium chloride (NS) flush 5-40 mL  5-40 mL IntraVENous PRN    acetaminophen (TYLENOL) tablet 650 mg  650 mg Oral Q6H PRN    Or    acetaminophen (TYLENOL) suppository 650 mg  650 mg Rectal Q6H PRN    polyethylene glycol (MIRALAX) packet 17 g  17 g Oral DAILY PRN    ondansetron (ZOFRAN ODT) tablet 4 mg  4 mg Oral Q8H PRN    Or    ondansetron (ZOFRAN) injection 4 mg  4 mg IntraVENous Q6H PRN    heparin (porcine) injection 5,000 Units  5,000 Units SubCUTAneous Q8H    melatonin tablet 3 mg  3 mg Oral QHS         REVIEW OF SYSTEMS   12 point ROS negative except as stated in the HPI.       Physical Exam:   Visit Vitals  /62 (BP 1 Location: Right upper arm, BP Patient Position: Supine)   Pulse (!) 105   Temp 97.8 °F (36.6 °C)   Resp 19   Ht 6' 1\" (1.854 m)   Wt 96 kg (211 lb 10.3 oz)   SpO2 (!) 88%   BMI 27.92 kg/m²       General:  Alert, cooperative, no distress, appears stated age. Head:  Normocephalic, without obvious abnormality, atraumatic. Eyes:  Conjunctivae/corneas clear. Nose: Nares normal. Septum midline   Throat: Lips, mucosa, and tongue normal.    Neck: Supple, symmetrical, trachea midline   Lungs:   Diminished   Chest wall:  No tenderness or deformity. Heart:  Regular rate and rhythm, S1, S2 normal, no murmur, click, rub or gallop. Abdomen:   Soft, non-tender. Bowel sounds normal.   Extremities: Extremities normal, atraumatic, no cyanosis or edema. Pulses: 2+ and symmetric all extremities. Skin: Skin color, texture, turgor normal. No rashes or lesions       Neurologic: Grossly nonfocal       Lab Results   Component Value Date/Time    Sodium 137 03/07/2022 05:04 AM    Potassium 4.8 03/07/2022 05:04 AM    Chloride 103 03/07/2022 05:04 AM    CO2 31 03/07/2022 05:04 AM    BUN 19 03/07/2022 05:04 AM    Creatinine 0.98 03/07/2022 05:04 AM    Glucose 141 (H) 03/07/2022 05:04 AM    Calcium 9.8 03/07/2022 05:04 AM    Lactic acid 0.8 03/06/2022 04:38 PM       Lab Results   Component Value Date/Time    WBC 15.5 (H) 03/07/2022 05:04 AM    HGB 14.3 03/07/2022 05:04 AM    PLATELET 186 23/20/7301 05:04 AM    MCV 98.7 03/07/2022 05:04 AM       Lab Results   Component Value Date/Time    Alk.  phosphatase 116 03/06/2022 04:38 PM    Protein, total 8.3 (H) 03/06/2022 04:38 PM    Albumin 3.2 (L) 03/06/2022 04:38 PM    Globulin 5.1 (H) 03/06/2022 04:38 PM       No results found for: IRON, FE, TIBC, IBCT, PSAT, FERR    Lab Results   Component Value Date/Time    TSH 0.60 03/07/2022 05:04 AM        No results found for: PH, PHI, PCO2, PCO2I, PO2, PO2I, HCO3, HCO3I, FIO2, FIO2I    No results found for: CPK, RCK1, RCK2, RCK3, RCK4, CKNDX, CKND1, TROPT, TROIQ, BNPP, BNP     Lab Results   Component Value Date/Time    Culture result: NO GROWTH 2 DAYS 03/06/2022 04:38 PM       No results found for: TOXA1, RPR, HBCM, HBSAG, HAAB, HCAB1, HAAT, G6PD, CRYAC, HIVGT, HIVR, HIV1, HIV12, HIVPC, HIVRPI    No results found for: VANCT, CPK    Lab Results   Component Value Date/Time    Color YELLOW/STRAW 03/06/2022 05:41 PM    Appearance CLEAR 03/06/2022 05:41 PM    pH (UA) 5.0 03/06/2022 05:41 PM    Protein 30 (A) 03/06/2022 05:41 PM    Glucose Negative 03/06/2022 05:41 PM    Ketone Negative 03/06/2022 05:41 PM    Bilirubin Negative 03/06/2022 05:41 PM    Blood Negative 03/06/2022 05:41 PM    Urobilinogen 0.2 03/06/2022 05:41 PM    Nitrites Negative 03/06/2022 05:41 PM    Leukocyte Esterase TRACE (A) 03/06/2022 05:41 PM    WBC 10-20 03/06/2022 05:41 PM    RBC 5-10 03/06/2022 05:41 PM    Bacteria Negative 03/06/2022 05:41 PM       IMPRESSION  · Acute on chronic respiratory failure with hypoxia  · SVEN, noncompliant with CPAP  · COPD, does not appear to be in exacerbation  · Chronic R hemidiaphragm elevation    PLAN  · Goal sats 88% or higher, wean O2 as tolerated   · Discussed with him that not wearing CPAP is contributing to his chronic hypoxia and that we will likely have difficultly weaning O2 if he is not wearing this. He is willing to try wearing CPAP while here as he is currently on a level of O2 that is not appropriate for discharge.   · PRN duonebs, would benefit from a trying additional long acting inhalers as an outpatient  · CTA pending  · Steroids added  · Mental status appears improved, but would obtain an ABG if this worsens again      Akhil Pineda NP

## 2022-03-08 NOTE — ROUTINE PROCESS
0400  Patient is restless and keeps trying to sit up. Patient removed leads, pulse ox, and patient wrist band. States tired of being here. 0700  Bedside and Verbal shift change report given to Nasrin Kay (oncoming nurse) by Stephanie Vázquez (offgoing nurse). Report included the following information SBAR, Kardex, ED Summary, Procedure Summary, Intake/Output, MAR, Recent Results and Cardiac Rhythm Sinus Tachycardia.

## 2022-03-08 NOTE — PROGRESS NOTES
Marilyn Webster was weaned from 13L O2 to his home rate of 4L. No change in respiratory status or WOB. Waveform and measurement are inaccurate on monitor. Portable pulse Ox show no desaturation on lower LPM.     Will continue to assess.      Dilan High RRT

## 2022-03-08 NOTE — PROGRESS NOTES
Physical Therapy    1400 Pt received in bed, resting O2 90% using 4L, no family at bedside. Pt reports too fatigued at this time for OOB activity after CT scan (-) PE. PT will continue to follow    Hallie Hawkins

## 2022-03-09 ENCOUNTER — APPOINTMENT (OUTPATIENT)
Dept: NON INVASIVE DIAGNOSTICS | Age: 82
DRG: 189 | End: 2022-03-09
Attending: NURSE PRACTITIONER
Payer: MEDICARE

## 2022-03-09 LAB
ANION GAP SERPL CALC-SCNC: 2 MMOL/L (ref 5–15)
BASOPHILS # BLD: 0 K/UL (ref 0–0.1)
BASOPHILS NFR BLD: 0 % (ref 0–1)
BUN SERPL-MCNC: 24 MG/DL (ref 6–20)
BUN/CREAT SERPL: 26 (ref 12–20)
CALCIUM SERPL-MCNC: 10 MG/DL (ref 8.5–10.1)
CHLORIDE SERPL-SCNC: 99 MMOL/L (ref 97–108)
CO2 SERPL-SCNC: 36 MMOL/L (ref 21–32)
CREAT SERPL-MCNC: 0.93 MG/DL (ref 0.7–1.3)
DIFFERENTIAL METHOD BLD: ABNORMAL
ECHO AO ASC DIAM: 3.3 CM
ECHO AO ASCENDING AORTA INDEX: 1.5 CM/M2
ECHO AV AREA PEAK VELOCITY: 3.5 CM2
ECHO AV AREA PEAK VELOCITY: 3.6 CM2
ECHO AV PEAK GRADIENT: 8 MMHG
ECHO AV PEAK VELOCITY: 1.4 M/S
ECHO AV VELOCITY RATIO: 0.79
ECHO LA DIAMETER INDEX: 1.18 CM/M2
ECHO LA DIAMETER: 2.6 CM
ECHO LA VOL 2C: 28 ML (ref 18–58)
ECHO LA VOL 4C: 17 ML (ref 18–58)
ECHO LA VOL BP: 22 ML (ref 18–58)
ECHO LA VOL BP: 22 ML (ref 18–58)
ECHO LA VOLUME AREA LENGTH: 25 ML
ECHO LA VOLUME INDEX A2C: 13 ML/M2 (ref 16–34)
ECHO LA VOLUME INDEX A4C: 8 ML/M2 (ref 16–34)
ECHO LA VOLUME INDEX AREA LENGTH: 11 ML/M2 (ref 16–34)
ECHO LV E' LATERAL VELOCITY: 9 CM/S
ECHO LV E' SEPTAL VELOCITY: 7 CM/S
ECHO LV EDV A2C: 61 ML
ECHO LV EDV A4C: 68 ML
ECHO LV EDV BP: 66 ML (ref 67–155)
ECHO LV EDV INDEX A4C: 31 ML/M2
ECHO LV EDV INDEX BP: 30 ML/M2
ECHO LV EDV NDEX A2C: 28 ML/M2
ECHO LV EJECTION FRACTION A2C: 52 %
ECHO LV EJECTION FRACTION A4C: 57 %
ECHO LV EJECTION FRACTION BIPLANE: 54 % (ref 55–100)
ECHO LV EJECTION FRACTION BIPLANE: 54 % (ref 55–100)
ECHO LV ESV A2C: 30 ML
ECHO LV ESV A4C: 30 ML
ECHO LV ESV BP: 31 ML (ref 22–58)
ECHO LV ESV INDEX A2C: 14 ML/M2
ECHO LV ESV INDEX A4C: 14 ML/M2
ECHO LV ESV INDEX BP: 14 ML/M2
ECHO LV FRACTIONAL SHORTENING: 27 % (ref 28–44)
ECHO LV INTERNAL DIMENSION DIASTOLE INDEX: 2 CM/M2
ECHO LV INTERNAL DIMENSION DIASTOLIC: 4.4 CM (ref 4.2–5.9)
ECHO LV INTERNAL DIMENSION SYSTOLIC INDEX: 1.45 CM/M2
ECHO LV INTERNAL DIMENSION SYSTOLIC: 3.2 CM
ECHO LV IVSD: 1 CM (ref 0.6–1)
ECHO LV MASS 2D: 147.8 G (ref 88–224)
ECHO LV MASS INDEX 2D: 67.2 G/M2 (ref 49–115)
ECHO LV POSTERIOR WALL DIASTOLIC: 1 CM (ref 0.6–1)
ECHO LV RELATIVE WALL THICKNESS RATIO: 0.45
ECHO LVOT AREA: 4.5 CM2
ECHO LVOT DIAM: 2.4 CM
ECHO LVOT PEAK GRADIENT: 5 MMHG
ECHO LVOT PEAK VELOCITY: 1.1 M/S
ECHO MV A VELOCITY: 0.95 M/S
ECHO MV E DECELERATION TIME (DT): 133.7 MS
ECHO MV E VELOCITY: 0.58 M/S
ECHO MV E/A RATIO: 0.61
ECHO MV E/E' LATERAL: 6.44
ECHO MV E/E' RATIO (AVERAGED): 7.37
ECHO MV E/E' SEPTAL: 8.29
ECHO PV MAX VELOCITY: 1 M/S
ECHO PV PEAK GRADIENT: 4 MMHG
ECHO RV INTERNAL DIMENSION: 4.6 CM
ECHO RV TAPSE: 2.3 CM (ref 1.5–2)
ECHO TV REGURGITANT MAX VELOCITY: 2.73 M/S
ECHO TV REGURGITANT PEAK GRADIENT: 30 MMHG
EOSINOPHIL # BLD: 0 K/UL (ref 0–0.4)
EOSINOPHIL NFR BLD: 0 % (ref 0–7)
ERYTHROCYTE [DISTWIDTH] IN BLOOD BY AUTOMATED COUNT: 12.9 % (ref 11.5–14.5)
GLUCOSE SERPL-MCNC: 102 MG/DL (ref 65–100)
HCT VFR BLD AUTO: 44.5 % (ref 36.6–50.3)
HGB BLD-MCNC: 14 G/DL (ref 12.1–17)
IMM GRANULOCYTES # BLD AUTO: 0.1 K/UL (ref 0–0.04)
IMM GRANULOCYTES NFR BLD AUTO: 0 % (ref 0–0.5)
LYMPHOCYTES # BLD: 1.9 K/UL (ref 0.8–3.5)
LYMPHOCYTES NFR BLD: 14 % (ref 12–49)
MCH RBC QN AUTO: 30.6 PG (ref 26–34)
MCHC RBC AUTO-ENTMCNC: 31.5 G/DL (ref 30–36.5)
MCV RBC AUTO: 97.4 FL (ref 80–99)
MONOCYTES # BLD: 1.1 K/UL (ref 0–1)
MONOCYTES NFR BLD: 8 % (ref 5–13)
NEUTS SEG # BLD: 10.5 K/UL (ref 1.8–8)
NEUTS SEG NFR BLD: 78 % (ref 32–75)
NRBC # BLD: 0 K/UL (ref 0–0.01)
NRBC BLD-RTO: 0 PER 100 WBC
PLATELET # BLD AUTO: 240 K/UL (ref 150–400)
PMV BLD AUTO: 9.1 FL (ref 8.9–12.9)
POTASSIUM SERPL-SCNC: 4.7 MMOL/L (ref 3.5–5.1)
RBC # BLD AUTO: 4.57 M/UL (ref 4.1–5.7)
SODIUM SERPL-SCNC: 137 MMOL/L (ref 136–145)
WBC # BLD AUTO: 13.6 K/UL (ref 4.1–11.1)

## 2022-03-09 PROCEDURE — 74011250636 HC RX REV CODE- 250/636: Performed by: INTERNAL MEDICINE

## 2022-03-09 PROCEDURE — 99222 1ST HOSP IP/OBS MODERATE 55: CPT | Performed by: PHYSICAL MEDICINE & REHABILITATION

## 2022-03-09 PROCEDURE — 77030012793 HC CIRC VNTLTR FISP -B

## 2022-03-09 PROCEDURE — 93306 TTE W/DOPPLER COMPLETE: CPT

## 2022-03-09 PROCEDURE — 97116 GAIT TRAINING THERAPY: CPT

## 2022-03-09 PROCEDURE — 74011250637 HC RX REV CODE- 250/637: Performed by: INTERNAL MEDICINE

## 2022-03-09 PROCEDURE — 97530 THERAPEUTIC ACTIVITIES: CPT

## 2022-03-09 PROCEDURE — 77030013032 HC MSK BPAP/CPAP FISP -B

## 2022-03-09 PROCEDURE — 94761 N-INVAS EAR/PLS OXIMETRY MLT: CPT

## 2022-03-09 PROCEDURE — 65660000000 HC RM CCU STEPDOWN

## 2022-03-09 PROCEDURE — 74011000250 HC RX REV CODE- 250: Performed by: INTERNAL MEDICINE

## 2022-03-09 PROCEDURE — 85025 COMPLETE CBC W/AUTO DIFF WBC: CPT

## 2022-03-09 PROCEDURE — 74011250637 HC RX REV CODE- 250/637: Performed by: FAMILY MEDICINE

## 2022-03-09 PROCEDURE — 94010 BREATHING CAPACITY TEST: CPT

## 2022-03-09 PROCEDURE — 77010033711 HC HIGH FLOW OXYGEN

## 2022-03-09 PROCEDURE — 93306 TTE W/DOPPLER COMPLETE: CPT | Performed by: STUDENT IN AN ORGANIZED HEALTH CARE EDUCATION/TRAINING PROGRAM

## 2022-03-09 PROCEDURE — 77010033678 HC OXYGEN DAILY

## 2022-03-09 PROCEDURE — 36415 COLL VENOUS BLD VENIPUNCTURE: CPT

## 2022-03-09 PROCEDURE — 80048 BASIC METABOLIC PNL TOTAL CA: CPT

## 2022-03-09 PROCEDURE — 74011636637 HC RX REV CODE- 636/637: Performed by: INTERNAL MEDICINE

## 2022-03-09 RX ADMIN — HEPARIN SODIUM 5000 UNITS: 5000 INJECTION INTRAVENOUS; SUBCUTANEOUS at 13:16

## 2022-03-09 RX ADMIN — AMLODIPINE BESYLATE 10 MG: 5 TABLET ORAL at 22:35

## 2022-03-09 RX ADMIN — SODIUM CHLORIDE, PRESERVATIVE FREE 10 ML: 5 INJECTION INTRAVENOUS at 13:16

## 2022-03-09 RX ADMIN — PREDNISONE 40 MG: 20 TABLET ORAL at 08:45

## 2022-03-09 RX ADMIN — SODIUM CHLORIDE 1 G: 9 INJECTION INTRAMUSCULAR; INTRAVENOUS; SUBCUTANEOUS at 15:20

## 2022-03-09 RX ADMIN — SODIUM CHLORIDE, PRESERVATIVE FREE 10 ML: 5 INJECTION INTRAVENOUS at 22:35

## 2022-03-09 RX ADMIN — Medication 3 MG: at 22:35

## 2022-03-09 RX ADMIN — GUAIFENESIN 600 MG: 600 TABLET ORAL at 08:45

## 2022-03-09 RX ADMIN — SODIUM CHLORIDE, PRESERVATIVE FREE 10 ML: 5 INJECTION INTRAVENOUS at 05:51

## 2022-03-09 RX ADMIN — GABAPENTIN 300 MG: 300 CAPSULE ORAL at 08:45

## 2022-03-09 RX ADMIN — GABAPENTIN 300 MG: 300 CAPSULE ORAL at 17:15

## 2022-03-09 RX ADMIN — ATORVASTATIN CALCIUM 10 MG: 10 TABLET, FILM COATED ORAL at 22:35

## 2022-03-09 RX ADMIN — HEPARIN SODIUM 5000 UNITS: 5000 INJECTION INTRAVENOUS; SUBCUTANEOUS at 05:51

## 2022-03-09 RX ADMIN — HEPARIN SODIUM 5000 UNITS: 5000 INJECTION INTRAVENOUS; SUBCUTANEOUS at 22:35

## 2022-03-09 RX ADMIN — GUAIFENESIN 600 MG: 600 TABLET ORAL at 22:35

## 2022-03-09 NOTE — PROGRESS NOTES
Problem: Mobility Impaired (Adult and Pediatric)  Goal: *Acute Goals and Plan of Care (Insert Text)  Description: FUNCTIONAL STATUS PRIOR TO ADMISSION: Patient was modified independent using a single point cane or rollator for functional mobility. Multiple falls in past year. 3-4 L home O2 during the day. HOME SUPPORT PRIOR TO ADMISSION: The patient lived with wife but did not require assist.  Called 911 with falls. Son in area    Physical Therapy Goals  Initiated 3/7/2022  1. Patient will move from supine to sit and sit to supine  in bed with modified independence within 7 day(s). 2.  Patient will transfer from bed to chair and chair to bed with minimal assistance/contact guard assist using the least restrictive device within 7 day(s). 3.  Patient will perform sit to stand with minimal assistance/contact guard assist within 7 day(s). 4.  Patient will ambulate with minimal assistance/contact guard assist for 25 feet with the least restrictive device within 7 day(s). Outcome: Progressing Towards Goal   PHYSICAL THERAPY TREATMENT  Patient: Diann Allen (75 y.o. male)  Date: 3/9/2022  Diagnosis: Acute respiratory failure with hypoxia ([facility] [J96.01]  SIRS (systemic inflammatory response syndrome) ([facility]) [R65.10] <principal problem not specified>       Precautions: Fall  Chart, physical therapy assessment, plan of care and goals were reviewed. ASSESSMENT  Patient continues with skilled PT services and is progressing towards goals. Communicated with nurse cleared for  therapy. Patient supine on bed when received. Rolled on the edge of bed min assist, supine to sit min assist, sit to stand min assist, ambulate with rolling walker min assist towards the recliner. No loss of balance steady siting and standing. OOB to chair as tolerated performed some active range of motion exercise. Activated chair alarm.  Nurse came in the room to check HR and agreed to monitor patient     Current Level of Function Impacting Discharge (mobility/balance): min assist with transfers and ambulation using a rolling walker    Other factors to consider for discharge: fall         PLAN :  Patient continues to benefit from skilled intervention to address the above impairments. Continue treatment per established plan of care. to address goals. Recommendation for discharge: (in order for the patient to meet his/her long term goals)  Therapy 3 hours per day 5-7 days per week    This discharge recommendation:  Has been made in collaboration with the attending provider and/or case management    IF patient discharges home will need the following DME: rolling walker       SUBJECTIVE:   Patient stated Luís Kerns.     OBJECTIVE DATA SUMMARY:   Critical Behavior:  Neurologic State: Alert  Orientation Level: Oriented X4  Cognition: Follows commands  Safety/Judgement: Awareness of environment  Functional Mobility Training:  Bed Mobility:  Rolling: Supervision  Supine to Sit: Supervision (HOB elevated + use of bed rails)  Sit to Supine:  (pt remained up at end of tx)  Scooting: Minimum assistance;Assist x1;Additional time        Transfers:  Sit to Stand: Minimum assistance;Assist x1;Additional time  Stand to Sit: Minimum assistance;Assist x1;Additional time  Stand Pivot Transfers: Minimum assistance; Additional time     Bed to Chair: Minimum assistance;Assist x1;Additional time (using rolling walker)                    Balance:  Sitting: Intact; High guard  Standing: Impaired  Standing - Static: Fair  Standing - Dynamic : Fair  Ambulation/Gait Training:  Distance (ft): 5 Feet (ft)  Assistive Device: Walker, rolling;Gait belt  Ambulation - Level of Assistance: Minimal assistance; Additional time     Gait Description (WDL): Exceptions to WDL  Gait Abnormalities: Path deviations; Step to gait        Base of Support: Widened     Speed/Amber: Pace decreased (<100 feet/min)  Step Length: Right shortened;Left shortened                         Therapeutic Exercises:   Educate and instructed patient to continue active range of motion exercise on both legs while up on chair or on bed multiple times. Recommend patient to be up on the chair at least 3 times a day every meal times as tolerated. Pain Ratin/10    Activity Tolerance:   Good    After treatment patient left in no apparent distress:   Sitting in chair, Heels elevated for pressure relief, Call bell within reach, and Bed / chair alarm activated    COMMUNICATION/COLLABORATION:   The patients plan of care was discussed with: Occupational therapist, Registered nurse, and Case management. Vidal Ventura PT,WCC.    Time Calculation: 24 mins

## 2022-03-09 NOTE — PROGRESS NOTES
Barrera Wu favian [address]  [address] [facility], [place], [address] [place] [address]  [phone]      Medical Progress Note      NAME: Frank Anderson   :  1940  MRM:  [id number]    Date/Time: 3/9/2022  8:05 PM         Subjective:     Chief Complaint:  \"I'm okay\"     Pt seen and examined. No complaints. ROS:  (bold if positive, if negative)           Objective:       Vitals:          Last 24hrs VS reviewed since prior progress note.  Most recent are:    Visit Vitals  /69   Pulse 96   Temp 97.9 °F (36.6 °C)   Resp 25   Ht 6' 1\" (1.854 m)   Wt 96 kg (211 lb 10.3 oz)   SpO2 91%   BMI 27.92 kg/m²     SpO2 Readings from Last 6 Encounters:   22 91%    O2 Flow Rate (L/min): 5 l/min       Intake/Output Summary (Last 24 hours) at 3/9/2022 1242  Last data filed at 3/9/2022 5689  Gross per 24 hour   Intake 720 ml   Output 190 ml   Net 530 ml          Exam:     Physical Exam:    Gen:  Well-developed, well-nourished, in no acute distress  HEENT:  Pink conjunctivae, PERRL, hearing intact to voice, moist mucous membranes  Neck:  Supple, without masses, thyroid non-tender  Resp:  No accessory muscle use, clear breath sounds without wheezes rales or rhonchi  Card:  No murmurs, normal S1, S2 without thrills, bruits or peripheral edema  Abd:  Soft, non-tender, non-distended, normoactive bowel sounds are present  Musc:  No cyanosis or clubbing  Skin:  No rashes or ulcers, skin turgor is good  Neuro:  Cranial nerves 3-12 are grossly intact,  strength is 5/5 bilaterally and dorsi / plantarflexion is 5/5 bilaterally, follows commands appropriately  Psych:  Good insight, oriented to person, place and time, alert    Medications Reviewed: (see below)    Lab Data Reviewed: (see below)    ______________________________________________________________________    Medications:     Current Facility-Administered Medications   Medication Dose Route Frequency    amLODIPine (NORVASC) tablet 10 mg  10 mg Oral QHS    gabapentin (NEURONTIN) capsule 300 mg  300 mg Oral BID    atorvastatin (LIPITOR) tablet 10 mg  10 mg Oral QHS    albuterol-ipratropium (DUO-NEB) 2.5 MG-0.5 MG/3 ML  3 mL Nebulization Q4H PRN    cefTRIAXone (ROCEPHIN) 1 g in 0.9% sodium chloride 10 mL IV syringe  1 g IntraVENous Q24H    predniSONE (DELTASONE) tablet 40 mg  40 mg Oral DAILY WITH BREAKFAST    hydrALAZINE (APRESOLINE) 20 mg/mL injection 10 mg  10 mg IntraVENous Q6H PRN    guaiFENesin ER (MUCINEX) tablet 600 mg  600 mg Oral Q12H    oxyCODONE IR (ROXICODONE) tablet 5 mg  5 mg Oral Q6H PRN    sodium chloride (NS) flush 5-40 mL  5-40 mL IntraVENous Q8H    sodium chloride (NS) flush 5-40 mL  5-40 mL IntraVENous PRN    acetaminophen (TYLENOL) tablet 650 mg  650 mg Oral Q6H PRN    Or    acetaminophen (TYLENOL) suppository 650 mg  650 mg Rectal Q6H PRN    polyethylene glycol (MIRALAX) packet 17 g  17 g Oral DAILY PRN    ondansetron (ZOFRAN ODT) tablet 4 mg  4 mg Oral Q8H PRN    Or    ondansetron (ZOFRAN) injection 4 mg  4 mg IntraVENous Q6H PRN    heparin (porcine) injection 5,000 Units  5,000 Units SubCUTAneous Q8H    melatonin tablet 3 mg  3 mg Oral QHS            Lab Review:     Recent Labs     03/09/22  0157 03/07/22  0504 03/06/22  1638   WBC 13.6* 15.5* 16.5*   HGB 14.0 14.3 14.1   HCT 44.5 45.7 45.8    263 299     Recent Labs     03/09/22  0157 03/07/22  0504 03/06/22  1638    137 137   K 4.7 4.8 5.0   CL 99 103 101   CO2 36* 31 37*   * 141* 142*   BUN 24* 19 29*   CREA 0.93 0.98 1.38*   CA 10.0 9.8 9.9   ALB  --   --  3.2*   ALT  --   --  28     No components found for: [place]         Assessment / Plan:   Hypoxia - unclear etiology. ?2/2 R hemidiaphragm elevation. ?COPD though no acute exacerbation, ?post COVID etiology   -proBNP not elevated   -VRP/COVID negative   -CPAP qHS   -continue O2  -on prednisone   -pulm on board     AMS - likely 2/2 hypercarbia.  Episode today occurred after woke from a nap and [employer] had to be called. Suspect 2/2 SVEN, obesity hypoventilation   -CPAP qHS   -NIF - 33     XOL   -on statin     HTN  -on amlodipine     Abnormal UA - did have trace leuk est and 10-20 WBC but a few epis and bacteria were negative   -s/p Cef  -no cultures sent; unlikely UTI     Weakness   -PT. OT on board  -likely IPR; pending auth     Total time spent with patient: 28 [address] [facility] discussed with: Patient and Family    Discussed:  Care Plan    Prophylaxis:  Lovenox    Disposition:  Home w/Family           ___________________________________________________    Attending Physician: Lakisha Quintana MD

## 2022-03-09 NOTE — PROGRESS NOTES
Physician Progress Note      PATIENTCarl Trena  CSN #:                  [id number]  :                       1940  ADMIT DATE:       3/6/2022 4:20 PM  [address] [place] DATE:  RESPONDING  PROVIDER #:        Deb Alejandra MD          QUERY TEXT:    Good afternoon  Pt. admitted with respiratory failure. Pt noted to have abnormal CTA results and being treated with IV Rocephin. If possible, please document in the progress notes and discharge summary if you are evaluating and/or treating any of the following:    Note: CAP and HCAP indicate where the pneumonia was acquired, not a specific type. The medical record reflects the following:  Risk Factors: respiratory failure, agitation, confusion, hypoxia  Clinical Indicators: CTA-Diffuse mild interstitial process could represent interstitial edema or an  atypical or viral pneumonia, decreased O2 sats on admission requiring HFNC  Treatment: IV Rocephin, increased supplemental O2, Nebulizer tx, RT    Thank you  Laura Cortez RN CDI  [phone]  Options provided:  -- Bacterial pneumonia  -- Viral pneumonia  -- Aspiration pneumonia  -- Gram negative pneumonia  -- Does not have pneumonia  -- Other - I will add my own diagnosis  -- Disagree - Not applicable / Not valid  -- Disagree - Clinically unable to determine / Unknown  -- Refer to Clinical Documentation Reviewer    PROVIDER RESPONSE TEXT:    This patient has viral pneumonia.     Query created by: Frank Tyler on 3/9/2022 2:30 PM      Electronically signed by:  Deb Alejandra MD 3/9/2022 3:08 PM

## 2022-03-09 NOTE — ACP (ADVANCE CARE PLANNING)
Advance Care Planning      Goals clear now for full restorative measures and full code- initially upon admission did not wish for intubation but changed this decision. Pt does state that both he and his wife have an AMD at home- he trusts wife Home Sauer to make decisions if he is unable and while would want attempts at resuscitation now he is clear that he would never want long term life support. He understands that as his chronic medical conditions progress with time, should have ongoing discussions w/ his family and medical team about code status and other goals of care.          Primary Decision Maker: Jennifer Horse - [phone]

## 2022-03-09 NOTE — PROGRESS NOTES
3:31 PM  CM spoke to Ary with [facility]- they will review today once therapy notes are in. CM spoke to PT and OT and they have seen the patient and continue to recommend IPR. Once note is in, CM will notify Ary. 12:02 PM  CM received messages via allscripts from [facility]- they have declined. CM placed a call to [facility] to ask if they can evaluate for out of network now that both [address] are unable to accept. Awaiting call. Lynda Sicard    9:50 AM  CM called and spoke to admissions with [facility]- they are unable to accept due to bed availability. CM called and spoke to admissions with [facility]- they will review and call CM back. Lynda Sicard      3/9/22  9:02 AM    Care Management Daily Progress Note:    RUR: 9%  Level: Low  LOS: 3D    CM reviewed EMR, noted [facility] it out of network for IPR, reviewed Allscripts, a referral has been placed with [facility]- awaiting reply. CM discussed with patients and his spouse- requested sending an additional referral to [address] [place] [address] as they are also in network with patients insurance. They are in agreement and sent referral- awaiting response. Transition of Care:    1. CM following  2. CT on 3/8  3. PT/OT/SLP following; home health PT/OT originally recommended- now recommending IPR- see above. 4. Pulmonary following; now on 4L O2  5. Accepted by [address] [place] [address] if [facility] is recommended again  6. Outpatient follow-up  7.  Wife will transport home at discharge    [facility]

## 2022-03-09 NOTE — PROGRESS NOTES
0700 Bedside shift change report given to [address] (oncoming nurse) by Dale Otero RN (offgoing nurse). Report included the following information SBAR, Kardex, ED Summary, Intake/Output, MAR and Recent Results. This patient was assisted with Intentional Toileting every 2 hours during this shift as appropriate. Documentation of ambulation and output reflected on Flowsheet as appropriate. Purposeful hourly rounding was completed using AIDET and 5Ps. Outcomes of PHR documented as they occurred. Bed alarm in use as appropriate. Dual Suction and ambubag in place. 1930   Bedside shift change report given to [address] (oncoming nurse) by Sowmya Mclean RN (offgoing nurse). Report included the following information SBAR, Kardex, ED Summary, Intake/Output, MAR and Recent Results.

## 2022-03-09 NOTE — PROGRESS NOTES
Barrera Wu favian [place] 81  [facility], [address] [place] [address]  [phone]      Medical Progress Note      NAME: Willian Maurer   :  1940  MRM:  [id number]    Date/Time: 3/8/2022  8:05 PM         Subjective:     Chief Complaint:  \"I'm okay\"     Pt seen and examined. Shortly after napping awoke very confused and was refusing O2 and agitated. CODE ATLAS called. Upon my arrival later, pt was much less confused and not agitated     ROS:  (bold if positive, if negative)             Objective:       Vitals:          Last 24hrs VS reviewed since prior progress note.  Most recent are:    Visit Vitals  BP (!) 142/69 (BP 1 Location: Right upper arm, BP Patient Position: At rest)   Pulse (!) 107   Temp 98 °F (36.7 °C)   Resp 22   Ht 6' 1\" (1.854 m)   Wt 96 kg (211 lb 10.3 oz)   SpO2 96%   BMI 27.92 kg/m²     SpO2 Readings from Last 6 Encounters:   22 96%    O2 Flow Rate (L/min): 4 l/min       Intake/Output Summary (Last 24 hours) at 3/8/2022 2005  Last data filed at 3/8/2022 1532  Gross per 24 hour   Intake 960 ml   Output 95 ml   Net 865 ml          Exam:     Physical Exam:    Gen:  Well-developed, well-nourished, in no acute distress  HEENT:  Pink conjunctivae, PERRL, hearing intact to voice, moist mucous membranes  Neck:  Supple, without masses, thyroid non-tender  Resp:  No accessory muscle use, clear breath sounds without wheezes rales or rhonchi  Card:  No murmurs, normal S1, S2 without thrills, bruits or peripheral edema  Abd:  Soft, non-tender, non-distended, normoactive bowel sounds are present  Musc:  No cyanosis or clubbing  Skin:  No rashes or ulcers, skin turgor is good  Neuro:  Cranial nerves 3-12 are grossly intact,  strength is 5/5 bilaterally and dorsi / plantarflexion is 5/5 bilaterally, follows commands appropriately  Psych:  Good insight, oriented to person, place and time, alert  Scattered wheeze     Medications Reviewed: (see below)    Lab Data Reviewed: (see below)    ______________________________________________________________________    Medications:     Current Facility-Administered Medications   Medication Dose Route Frequency    amLODIPine (NORVASC) tablet 10 mg  10 mg Oral QHS    gabapentin (NEURONTIN) capsule 300 mg  300 mg Oral BID    atorvastatin (LIPITOR) tablet 10 mg  10 mg Oral QHS    iopamidoL (ISOVUE-370) 76 % injection        albuterol-ipratropium (DUO-NEB) 2.5 MG-0.5 MG/3 ML  3 mL Nebulization Q4H PRN    cefTRIAXone (ROCEPHIN) 1 g in 0.9% sodium chloride 10 mL IV syringe  1 g IntraVENous Q24H    predniSONE (DELTASONE) tablet 40 mg  40 mg Oral DAILY WITH BREAKFAST    hydrALAZINE (APRESOLINE) 20 mg/mL injection 10 mg  10 mg IntraVENous Q6H PRN    guaiFENesin ER (MUCINEX) tablet 600 mg  600 mg Oral Q12H    oxyCODONE IR (ROXICODONE) tablet 5 mg  5 mg Oral Q6H PRN    sodium chloride (NS) flush 5-40 mL  5-40 mL IntraVENous Q8H    sodium chloride (NS) flush 5-40 mL  5-40 mL IntraVENous PRN    acetaminophen (TYLENOL) tablet 650 mg  650 mg Oral Q6H PRN    Or    acetaminophen (TYLENOL) suppository 650 mg  650 mg Rectal Q6H PRN    polyethylene glycol (MIRALAX) packet 17 g  17 g Oral DAILY PRN    ondansetron (ZOFRAN ODT) tablet 4 mg  4 mg Oral Q8H PRN    Or    ondansetron (ZOFRAN) injection 4 mg  4 mg IntraVENous Q6H PRN    heparin (porcine) injection 5,000 Units  5,000 Units SubCUTAneous Q8H    melatonin tablet 3 mg  3 mg Oral QHS            Lab Review:     Recent Labs     03/07/22  0504 03/06/22  1638   WBC 15.5* 16.5*   HGB 14.3 14.1   HCT 45.7 45.8    299     Recent Labs     03/07/22  0504 03/06/22  1638    137   K 4.8 5.0    101   CO2 31 37*   * 142*   BUN 19 29*   CREA 0.98 1.38*   CA 9.8 9.9   ALB  --  3.2*   ALT  --  28     No components found for: [place]         Assessment / Plan:   Hypoxia - unclear etiology. ?2/2 R hemidiaphragm elevation.  ?COPD though no acute exacerbation, ?post COVID etiology   -proBNP not elevated   -check full VRP + COVID   -CPAP qHS   -continue O2  -on prednisone     AMS - likely 2/2 hypercarbia. Episode today occurred after woke from a nap and CODE ATLAS had to be called. Suspect 2/2 SVEN, obesity hypoventilation   -CPAP qHS   -check NIF    XOL   -on statin     HTN  -on amlodipine     Abnormal UA - did have trace leuk est and 10-20 WBC but a few epis and bacteria were negative   -s/p Cef  -no cultures sent; unlikely UTI     Weakness   -PT. OT on board  -likely IPR     Total time spent with patient: [phone] discussed with: Patient and Family    Discussed:  Care Plan    Prophylaxis:  Lovenox    Disposition:  Home w/Family           ___________________________________________________    Attending Physician: Marianela Gaitan MD

## 2022-03-09 NOTE — CONSULTS
Palliative Medicine Consult  [place]ucker: [phone]    Patient Name: Sri Ramirez  YOB: 1940    Date of Initial Consult: 3/9/22  Reason for Consult: Care decisions   Requesting Provider: Indiana Hong  Primary Care Physician: KAITLIN Rios     SUMMARY:   Sri Ramirez is a 80 y.o. with a past history of COPD on home O2 2-3L, SVEN noncompliant with CPAP, elevated R hemidiaphragm who was admitted on 3/6/2022 from home with confusion and increased falls. Incr O2 requirements. Pulm evaluated (has seen Dr Nu Epps in past) and concern that his acute on chronic resp failure related to noncompliance w/ CPAP. Plan is for outpatient sleep study and possible trial of BIPAP. On Prednisone. CT head w/out acute findings. Has had agitation/confusion intermittently this admission w/ request for leaving AMA in ED and code atlas called 3/8. Improved now. Current medical issues leading to Palliative Medicine involvement include: care decisions. Social:  to [employer] for >50 years, have 2 children- son is local and dtr in [place]. Has grand and great grand children. PALLIATIVE DIAGNOSES:   1. Shortness of breath improved  2. Generalized weakness  3. Confusion 3/8, improved  4. Goals of care       PLAN:   1. Meet pt who is awake, alert, oriented and calm. He is apologetic about becoming agitated yesterday, may have been because of hypercarbia. 2. Understands the importance of following pulmonary recommendations and is willing to f/u with another sleep study and trying CPAP again or BIPAP- although states if it is very uncomfortable he is not sure he can be completely compliant. 3. While able to do ADLs has had more falls recently and required more assistance- may be due in part to untreated SVEN. 4. Goals clear now for full restorative measures and full code- initially upon admission did not wish for intubation but changed this decision.  He does state that both he and his wife have an AMD at home- he trusts wife Clarisa Petty to make decisions and while would want attempts at resuscitation now he is clear that he would never want long term life support. He understands that as his chronic medical conditions progress with time, should have ongoing discussions w/ his family and medical team about code status and other goals of care. 5. Will follow peripherally with you. 6. Initial consult note routed to primary continuity provider and/or primary health care team members  7. Communicated plan of care with: Palliative [facility] Team     GOALS OF CARE / TREATMENT PREFERENCES:     GOALS OF CARE:  Patient/Health Care Proxy Stated Goals: Rehabilitation    TREATMENT PREFERENCES:   Code Status: Full Code    Patient and family's personal goals include: to go to rehab and then go home. Advance Care Planning:  [x] [facility] Interdisciplinary Team has updated the ACP Navigator with Health Care Decision Maker and Patient Capacity      Primary Decision Maker: Efrain - [phone]  No flowsheet data found. Medical Interventions: Full interventions       Other:    As far as possible, the palliative care team has discussed with patient / health care proxy about goals of care / treatment preferences for patient. HISTORY:     History obtained from: pt, chart, staff    CHIEF COMPLAINT: \"I feel good\"    HPI/SUBJECTIVE:    The patient is:   [x] Verbal and participatory  [] Non-participatory due to:     Pt on 5L NC O2, denies feeling short of breath while at rest. No pain. Irene Members to get to rehab- care management working on this.      Clinical Pain Assessment (nonverbal scale for severity on nonverbal patients):   Clinical Pain Assessment  Severity: 0          Duration: for how long has pt been experiencing pain (e.g., 2 days, 1 month, years)  Frequency: how often pain is an issue (e.g., several times per day, once every few days, constant)     FUNCTIONAL ASSESSMENT:     Palliative Performance Scale (PPS):  PPS: 60       PSYCHOSOCIAL/SPIRITUAL SCREENING:     Palliative IDT has assessed this patient for cultural preferences / practices and a referral made as appropriate to needs (Cultural Services, Patient Advocacy, Ethics, etc.)    Any spiritual / Rastafari concerns:  [] Yes /  [x] No   If \"Yes\" to discuss with pastoral care during IDT     Does caregiver feel burdened by caring for their loved one:   [] Yes /  [x] No /  [] No Caregiver Present    If \"Yes\" to discuss with social work during IDT    Anticipatory grief assessment:   [x] Normal  / [] Maladaptive     If \"Maladaptive\" to discuss with social work during IDT    ESAS Anxiety: Anxiety: 0    ESAS Depression: Depression: 0        REVIEW OF SYSTEMS:     Positive and pertinent negative findings in ROS are noted above in HPI. The following systems were [x] reviewed / [] unable to be reviewed as noted in HPI  Other findings are noted below. Systems: constitutional, ears/nose/mouth/throat, respiratory, gastrointestinal, genitourinary, musculoskeletal, integumentary, neurologic, psychiatric, endocrine. Positive findings noted below. Modified ESAS Completed by: provider   Fatigue: 5 Drowsiness: 0   Depression: 0 Pain: 0   Anxiety: 0 Nausea: 0   Anorexia: 0 Dyspnea: 1           Stool Occurrence(s): 1        PHYSICAL EXAM:     From RN flowsheet:  Wt Readings from Last 3 Encounters:   03/09/22 211 lb 10.3 oz (96 kg)     Blood pressure 128/69, pulse 96, temperature 97.9 °F (36.6 °C), resp. rate 25, height 6' 1\" (1.854 m), weight 211 lb 10.3 oz (96 kg), SpO2 91 %.     Pain Scale 1: Numeric (0 - 10)  Pain Intensity 1: 0  Pain Onset 1: Chronic   Pain Location 1: Generalized  Pain Orientation 1: Anterior,Posterior  Pain Description 1: Aching  Pain Intervention(s) 1: Declines  Last bowel movement, if known:     Constitutional: awake, alert, oriented, calm and engaging   Eyes: pupils equal, anicteric  ENMT: no nasal discharge, moist mucous membranes  Respiratory: breathing not labored, able to speak in full sentences   Skin: warm, dry  Neurologic: following commands, moving all extremities  Psychiatric: full affect, no hallucinations         HISTORY:     Active Problems:    SIRS (systemic inflammatory response syndrome) ([facility] [address] (3/6/2022)      Acute respiratory failure with hypoxia ([facility] [address] (3/6/2022)      No past medical history on file. No past surgical history on file. No family history on file. History reviewed, no pertinent family history.   Social History     Tobacco Use    Smoking status: Not on file    Smokeless tobacco: Not on file   Substance Use Topics    Alcohol use: Not on file     No Known Allergies   Current Facility-Administered Medications   Medication Dose Route Frequency    amLODIPine (NORVASC) tablet 10 mg  10 mg Oral QHS    gabapentin (NEURONTIN) capsule 300 mg  300 mg Oral BID    atorvastatin (LIPITOR) tablet 10 mg  10 mg Oral QHS    albuterol-ipratropium (DUO-NEB) 2.5 MG-0.5 MG/3 ML  3 mL Nebulization Q4H PRN    cefTRIAXone (ROCEPHIN) 1 g in 0.9% sodium chloride 10 mL IV syringe  1 g IntraVENous Q24H    predniSONE (DELTASONE) tablet 40 mg  40 mg Oral DAILY WITH BREAKFAST    hydrALAZINE (APRESOLINE) 20 mg/mL injection 10 mg  10 mg IntraVENous Q6H PRN    guaiFENesin ER (MUCINEX) tablet 600 mg  600 mg Oral Q12H    oxyCODONE IR (ROXICODONE) tablet 5 mg  5 mg Oral Q6H PRN    sodium chloride (NS) flush 5-40 mL  5-40 mL IntraVENous Q8H    sodium chloride (NS) flush 5-40 mL  5-40 mL IntraVENous PRN    acetaminophen (TYLENOL) tablet 650 mg  650 mg Oral Q6H PRN    Or    acetaminophen (TYLENOL) suppository 650 mg  650 mg Rectal Q6H PRN    polyethylene glycol (MIRALAX) packet 17 g  17 g Oral DAILY PRN    ondansetron (ZOFRAN ODT) tablet 4 mg  4 mg Oral Q8H PRN    Or    ondansetron (ZOFRAN) injection 4 mg  4 mg IntraVENous Q6H PRN    heparin (porcine) injection 5,000 Units  5,000 Units SubCUTAneous Q8H    melatonin tablet 3 mg  3 mg Oral QHS          LAB AND IMAGING FINDINGS:     Lab Results   Component Value Date/Time    WBC 13.6 (H) 03/09/2022 01:57 AM    HGB 14.0 03/09/2022 01:57 AM    PLATELET 782 51/94/8815 01:57 AM     Lab Results   Component Value Date/Time    Sodium 137 03/09/2022 01:57 AM    Potassium 4.7 03/09/2022 01:57 AM    Chloride 99 03/09/2022 01:57 AM    CO2 36 (H) 03/09/2022 01:57 AM    BUN 24 (H) 03/09/2022 01:57 AM    Creatinine 0.93 03/09/2022 01:57 AM    Calcium 10.0 03/09/2022 01:57 AM      Lab Results   Component Value Date/Time    Alk. phosphatase 116 03/06/2022 04:38 PM    Protein, total 8.3 (H) 03/06/2022 04:38 PM    Albumin 3.2 (L) 03/06/2022 04:38 PM    Globulin 5.1 (H) 03/06/2022 04:38 PM     No results found for: INR, PTMR, PTP, PT1, PT2, APTT, INREXT, INREXT   No results found for: IRON, FE, TIBC, IBCT, PSAT, FERR   No results found for: PH, PCO2, PO2  No components found for: GLPOC   No results found for: CPK, CKMB             Total time: 50 min   Counseling / coordination time, spent as noted above: 35 min   > 50% counseling / coordination?: yes    Prolonged service was provided for  []30 min   []75 min in face to face time in the presence of the patient, spent as noted above. Time Start:   Time End:   Note: this can only be billed with 75534 (initial) or 68103 (follow up). If multiple start / stop times, list each separately.

## 2022-03-09 NOTE — ROUTINE PROCESS
2257  Patient Respiratory PCR came back negative. Dr. Brielle Blas  notified, precautions discontinued. 0700  Bedside and Verbal shift change report given to Morris Eddy (oncoming nurse) by Dustin Mendez (offgoing nurse). Report included the following information SBAR, Kardex, ED Summary, Procedure Summary, Intake/Output, MAR, Recent Results and Cardiac Rhythm Sinus Tachycardia.

## 2022-03-09 NOTE — PROGRESS NOTES
Problem: Self Care Deficits Care Plan (Adult)  Goal: *Acute Goals and Plan of Care (Insert Text)  Description: FUNCTIONAL STATUS PRIOR TO ADMISSION: Patient was modified independent for functional mobility, and ADLs. HOME SUPPORT: The patient lived with spouse, and with recent increased need for assist with tasks. Occupational Therapy Goals  Initiated 3/7/2022  1. Patient will perform lower body dressing with supervision/set-up within 7 day(s). 2.  Patient will perform bathing with supervision/set-up within 7 day(s). 3.  Patient will perform toilet transfers with minimal assistance/contact guard assist within 7 day(s). 4.  Patient will perform all aspects of toileting with minimal assistance/contact guard assist within 7 day(s). 5.  Patient will participate in upper extremity therapeutic exercise/activities with supervision/set-up for 10 minutes within 7 day(s). Outcome: Progressing Towards Goal   OCCUPATIONAL THERAPY TREATMENT  Patient: Mati Juárez (20 y.o. male)  Date: 3/9/2022  Diagnosis: Acute respiratory failure with hypoxia ([facility] [address] [J96.01]  SIRS (systemic inflammatory response syndrome) ([facility]) [R65.10] <principal problem not specified>       Precautions: Fall  Chart, occupational therapy assessment, plan of care, and goals were reviewed. ASSESSMENT  Patient continues with skilled OT services and is progressing towards goals. Mr. Jalyn Manzano was received in bed sleeping but able to arouse with minimal stimuli. He was agreeable and motivated for activity. Patient able to come to sitting EOB with supervision and the bed modified. Patient with good overall sitting balance. Patient was able to transfer around the bed and to the chair to the recliner. The telemetry monitor gave a false reading of 225 bpm; Staff confirms HR elevated to 120 bpm with activity at the highest.  Patient requires 2L O2 at rest and 4L O2 with activity to maintain SpO2 >90%.   Education provided regarding energy conservation, pacing, and pursed lip breathing techniques. Patient setup for grooming tasks at end of tx. Patient is making good progress daily and would benefit from continued skilled OT. Continue to highly recommend inpatient rehab at discharge. Current Level of Function Impacting Discharge (ADLs): Patient required supervision for bed mobility and min A for OOB transfers. Patient requires mod A for LB dressing and setup for grooming tasks. PLAN :  Patient continues to benefit from skilled intervention to address the above impairments. Continue treatment per established plan of care to address goals. Recommend with staff:   Recommend patient be OOB to chair as frequently as tolerated; Goal of 3x/day for all meals for 60 minutes at a time. For toileting needs, recommend transfers to/from bathroom with staff assist.  Encourage patient involvement in personal care as able. Recommend next OT session: Continue towards set OT goals. Recommendation for discharge: (in order for the patient to meet his/her long term goals)  Therapy 3 hours per day 5-7 days per week    This discharge recommendation:  Has been made in collaboration with the attending provider and/or case management    IF patient discharges home will need the following DME: Rolling walker       SUBJECTIVE:   Patient stated I feel better today.     OBJECTIVE DATA SUMMARY:   Cognitive/Behavioral Status:  Neurologic State: Alert  Orientation Level: Oriented X4  Cognition: Follows commands  Perception: Appears intact  Perseveration: No perseveration noted  Safety/Judgement: Awareness of environment    Functional Mobility and Transfers for ADLs:  Bed Mobility:  Rolling: Supervision  Supine to Sit: Supervision (HOB elevated + use of bed rails)  Sit to Supine:  (pt remained up at end of tx)  Scooting: Minimum assistance;Assist x1;Additional time    Transfers:  Sit to Stand: Minimum assistance;Assist x1;Additional time  Bed to Chair: Minimum assistance;Assist x1;Additional time (using rolling walker)    Balance:  Sitting: Intact; High guard  Standing: Impaired  Standing - Static: Fair  Standing - Dynamic : Fair    ADL Intervention:  Grooming: setup from supported sitting position (chair)    Lower Body Dressing Assistance  Dressing Assistance: Moderate assistance  Socks: Moderate assistance  Leg Crossed Method Used:  (attempted but unable to fully achieve)  Position Performed: Seated in chair  Cues: Physical assistance;Verbal cues provided     Cognitive Retraining  Safety/Judgement: Awareness of environment    Activity Tolerance:   Good    After treatment patient left in no apparent distress:   Sitting in chair, Call bell within reach, and Bed / chair alarm activated    COMMUNICATION/COLLABORATION:   The patients plan of care was discussed with: Physical therapist, Registered nurse, and patient .      [place]ia Harris OTR/L  Time Calculation: 27 mins

## 2022-03-09 NOTE — PROGRESS NOTES
Name: [facility]: Gregoria Davenport   : 1940 Admit Date: 3/6/2022   Phone:   Room: 336/01   PCP: Senia Gallardo  MRN: [id number]   Date: 3/9/2022  Code: Full Code          Chart and notes reviewed. Data reviewed. I review the patient's current medications in the medical record at each encounter. I have evaluated and examined the patient. HPI:    8:33 AM       History was obtained from patient. I was asked by Tacho Giron DO to see Rai Teixeira in consultation for a chief complaint of acute on chronic respiratory failure and elevated R hemidiaphragm. History of Present Illness:  Mr. Iqra Lamar is a 81 yo gentleman with a history of COPD (moderately severe obstruction, FEV1 1.60L, 52% in 2019), chronic respiratory failure with hypoxia, SVEN (noncompliant with CPAP), and elevated R hemidiaphragm who is admitted for ataxia and confusion. He is followed by both the Pulmonary and Sleep Divisions of [facility]. He was last seen in the Pulmonary clinic by Dr. Bryon Gilbert in 2020. He was initially referred to us for R hemidiaphragm elevation that was discovered in 2019. He denies known trauma or surgeries. He had PFTs that showed moderately severe obstruction and was found to be hypoxic as well as to have SVEN. He did not note benefit from long acting inhalers and did not continue them. He did not tolerated his CPAP and was not wearing O2 as he felt he did not need/tolerate it. He did not follow-up with pulmonary as planned. He did see sleep as recently as 2022 and at that time O2 had been restarted by his PCP, but he was not willing try his CPAP again at that time. He comes in with confusion and falls. He sats were in the 70's on 6L initially and has required up to 8L to maintain his sats (is on 2-3L at home).      Labs reviewed: WBC 15.5, Hgb 14.3, , HCO2 31 (initially 37), cr 0.98,     Images reviewed:  CXR 3/6/2022 with chronic R hemidiaphragm elevation and associated atelectasis    Interval history:    Afebrile  Bp soft  Sats 93% on 5L NC--weaned to 4.5L in room. Did not wear CPAP overnight  WBC 13.6--decreased  Blood cultures negative x 3 days  Code atlas called yesterday due to confusion  RVP negative  Pro-  CTA 3/8:  No evidence for pulmonary embolism. Diffuse mild interstitial process could represent interstitial edema or an  atypical or viral pneumonia Chronic elevation of the right hemidiaphragm    ROS:  Feels fine this morning. Denies SOB. Wants to go home.         Social History     Tobacco Use    Smoking status: Not on file    Smokeless tobacco: Not on file   Substance Use Topics    Alcohol use: Not on file       No Known Allergies    Current Facility-Administered Medications   Medication Dose Route Frequency    amLODIPine (NORVASC) tablet 10 mg  10 mg Oral QHS    gabapentin (NEURONTIN) capsule 300 mg  300 mg Oral BID    atorvastatin (LIPITOR) tablet 10 mg  10 mg Oral QHS    albuterol-ipratropium (DUO-NEB) 2.5 MG-0.5 MG/3 ML  3 mL Nebulization Q4H PRN    cefTRIAXone (ROCEPHIN) 1 g in 0.9% sodium chloride 10 mL IV syringe  1 g IntraVENous Q24H    predniSONE (DELTASONE) tablet 40 mg  40 mg Oral DAILY WITH BREAKFAST    hydrALAZINE (APRESOLINE) 20 mg/mL injection 10 mg  10 mg IntraVENous Q6H PRN    guaiFENesin ER (MUCINEX) tablet 600 mg  600 mg Oral Q12H    oxyCODONE IR (ROXICODONE) tablet 5 mg  5 mg Oral Q6H PRN    sodium chloride (NS) flush 5-40 mL  5-40 mL IntraVENous Q8H    sodium chloride (NS) flush 5-40 mL  5-40 mL IntraVENous PRN    acetaminophen (TYLENOL) tablet 650 mg  650 mg Oral Q6H PRN    Or    acetaminophen (TYLENOL) suppository 650 mg  650 mg Rectal Q6H PRN    polyethylene glycol (MIRALAX) packet 17 g  17 g Oral DAILY PRN    ondansetron (ZOFRAN ODT) tablet 4 mg  4 mg Oral Q8H PRN    Or    ondansetron (ZOFRAN) injection 4 mg  4 mg IntraVENous Q6H PRN    heparin (porcine) injection 5,000 Units  5,000 Units SubCUTAneous Q8H  melatonin tablet 3 mg  3 mg Oral QHS         REVIEW OF SYSTEMS   12 point ROS negative except as stated in the HPI. Physical Exam:   Visit Vitals  /62   Pulse (!) 107   Temp 97.9 °F (36.6 °C)   Resp 16   Ht 6' 1\" (1.854 m)   Wt 96 kg (211 lb 10.3 oz)   SpO2 93%   BMI 27.92 kg/m²       General:  Alert, cooperative, no distress, appears stated age. Head:  Normocephalic, without obvious abnormality, atraumatic. Eyes:  Conjunctivae/corneas clear. Nose: Nares normal. Septum midline   Throat: Lips, mucosa, and tongue normal.    Neck: Supple, symmetrical, trachea midline   Lungs:   Diminished   Chest wall:  No tenderness or deformity. Heart:  Regular rate and rhythm, S1, S2 normal, no murmur, click, rub or gallop. Abdomen:   Soft, non-tender. Bowel sounds normal.   Extremities: Extremities normal, atraumatic, no cyanosis or edema. Pulses: 2+ and symmetric all extremities. Skin: Skin color, texture, turgor normal. No rashes or lesions       Neurologic: Grossly nonfocal       Lab Results   Component Value Date/Time    Sodium 137 03/09/2022 01:57 AM    Potassium 4.7 03/09/2022 01:57 AM    Chloride 99 03/09/2022 01:57 AM    CO2 36 (H) 03/09/2022 01:57 AM    BUN 24 (H) 03/09/2022 01:57 AM    Creatinine 0.93 03/09/2022 01:57 AM    Glucose 102 (H) 03/09/2022 01:57 AM    Calcium 10.0 03/09/2022 01:57 AM    Lactic acid 0.8 03/06/2022 04:38 PM       Lab Results   Component Value Date/Time    WBC 13.6 (H) 03/09/2022 01:57 AM    HGB 14.0 03/09/2022 01:57 AM    PLATELET 851 28/68/1615 01:57 AM    MCV 97.4 03/09/2022 01:57 AM       Lab Results   Component Value Date/Time    Alk.  phosphatase 116 03/06/2022 04:38 PM    Protein, total 8.3 (H) 03/06/2022 04:38 PM    Albumin 3.2 (L) 03/06/2022 04:38 PM    Globulin 5.1 (H) 03/06/2022 04:38 PM       No results found for: IRON, FE, TIBC, IBCT, PSAT, FERR    Lab Results   Component Value Date/Time    TSH 0.60 03/07/2022 05:04 AM        Lab Results   Component Value Date/Time    PHI 7.33 (L) 03/08/2022 03:35 PM    PCO2I 72.5 (H) 03/08/2022 03:35 PM    PO2I 68 (L) 03/08/2022 03:35 PM    HCO3I 38.2 (H) 03/08/2022 03:35 PM       No results found for: CPK, RCK1, RCK2, RCK3, RCK4, CKNDX, CKND1, TROPT, TROIQ, BNPP, BNP     Lab Results   Component Value Date/Time    Culture result: NO GROWTH 3 DAYS 03/06/2022 04:38 PM       No results found for: TOXA1, RPR, HBCM, HBSAG, HAAB, HCAB1, HAAT, G6PD, CRYAC, HIVGT, HIVR, HIV1, HIV12, HIVPC, HIVRPI    No results found for: VANCT, CPK    Lab Results   Component Value Date/Time    Color YELLOW/STRAW 03/06/2022 05:41 PM    Appearance CLEAR 03/06/2022 05:41 PM    pH (UA) 5.0 03/06/2022 05:41 PM    Protein 30 (A) 03/06/2022 05:41 PM    Glucose Negative 03/06/2022 05:41 PM    Ketone Negative 03/06/2022 05:41 PM    Bilirubin Negative 03/06/2022 05:41 PM    Blood Negative 03/06/2022 05:41 PM    Urobilinogen 0.2 03/06/2022 05:41 PM    Nitrites Negative 03/06/2022 05:41 PM    Leukocyte Esterase TRACE (A) 03/06/2022 05:41 PM    WBC 10-20 03/06/2022 05:41 PM    RBC 5-10 03/06/2022 05:41 PM    Bacteria Negative 03/06/2022 05:41 PM       IMPRESSION  · Acute on chronic respiratory failure with hypoxia  · SVEN, noncompliant with CPAP  · COPD, does not appear to be in exacerbation  · Abnormal Chest CT: with edema vs. Atypical pneumonia. Of note, he was sick about a month ago, however had 2 negative Covid tests (one PCR, one rapid)  · Chronic R hemidiaphragm elevation    PLAN  · Goal sats 88% or higher, wean O2 as tolerated   · Discussed with him that not wearing CPAP is contributing to his chronic hypoxia and that we will likely have difficulty weaning O2 if he is not wearing this. He needs outpatient sleep follow-up with repeat study; may tolerate BiPap better.   · PRN duonebs, would benefit from a trying additional long acting inhalers as an outpatient  · ECHO pending  · Steroids added  · Mental status appears improved, but would obtain an ABG if this worsens again      Rohan Moise NP

## 2022-03-10 PROCEDURE — 97535 SELF CARE MNGMENT TRAINING: CPT

## 2022-03-10 PROCEDURE — 74011000250 HC RX REV CODE- 250: Performed by: INTERNAL MEDICINE

## 2022-03-10 PROCEDURE — 74011636637 HC RX REV CODE- 636/637: Performed by: INTERNAL MEDICINE

## 2022-03-10 PROCEDURE — 74011250637 HC RX REV CODE- 250/637: Performed by: FAMILY MEDICINE

## 2022-03-10 PROCEDURE — 94761 N-INVAS EAR/PLS OXIMETRY MLT: CPT

## 2022-03-10 PROCEDURE — 65660000000 HC RM CCU STEPDOWN

## 2022-03-10 PROCEDURE — 97116 GAIT TRAINING THERAPY: CPT

## 2022-03-10 PROCEDURE — 74011250636 HC RX REV CODE- 250/636: Performed by: INTERNAL MEDICINE

## 2022-03-10 PROCEDURE — 77010033678 HC OXYGEN DAILY

## 2022-03-10 PROCEDURE — 74011250637 HC RX REV CODE- 250/637: Performed by: INTERNAL MEDICINE

## 2022-03-10 PROCEDURE — 97530 THERAPEUTIC ACTIVITIES: CPT

## 2022-03-10 RX ADMIN — GABAPENTIN 300 MG: 300 CAPSULE ORAL at 17:21

## 2022-03-10 RX ADMIN — HEPARIN SODIUM 5000 UNITS: 5000 INJECTION INTRAVENOUS; SUBCUTANEOUS at 05:13

## 2022-03-10 RX ADMIN — SODIUM CHLORIDE, PRESERVATIVE FREE 10 ML: 5 INJECTION INTRAVENOUS at 13:44

## 2022-03-10 RX ADMIN — AMLODIPINE BESYLATE 10 MG: 5 TABLET ORAL at 21:21

## 2022-03-10 RX ADMIN — SODIUM CHLORIDE, PRESERVATIVE FREE 10 ML: 5 INJECTION INTRAVENOUS at 21:23

## 2022-03-10 RX ADMIN — ATORVASTATIN CALCIUM 10 MG: 10 TABLET, FILM COATED ORAL at 21:21

## 2022-03-10 RX ADMIN — GABAPENTIN 300 MG: 300 CAPSULE ORAL at 08:27

## 2022-03-10 RX ADMIN — GUAIFENESIN 600 MG: 600 TABLET ORAL at 21:21

## 2022-03-10 RX ADMIN — SODIUM CHLORIDE, PRESERVATIVE FREE 10 ML: 5 INJECTION INTRAVENOUS at 05:13

## 2022-03-10 RX ADMIN — GUAIFENESIN 600 MG: 600 TABLET ORAL at 09:00

## 2022-03-10 RX ADMIN — HEPARIN SODIUM 5000 UNITS: 5000 INJECTION INTRAVENOUS; SUBCUTANEOUS at 13:43

## 2022-03-10 RX ADMIN — PREDNISONE 40 MG: 20 TABLET ORAL at 08:27

## 2022-03-10 RX ADMIN — HEPARIN SODIUM 5000 UNITS: 5000 INJECTION INTRAVENOUS; SUBCUTANEOUS at 21:21

## 2022-03-10 RX ADMIN — Medication 3 MG: at 21:21

## 2022-03-10 NOTE — PROGRESS NOTES
Problem: Falls - Risk of  Goal: *Absence of Falls  Description: Document Alex Mar Fall Risk and appropriate interventions in the flowsheet.   Outcome: Progressing Towards Goal  Note: Fall Risk Interventions:  Mobility Interventions: Bed/chair exit alarm         Medication Interventions: Bed/chair exit alarm    Elimination Interventions: Bed/chair exit alarm,Call light in reach    History of Falls Interventions: Bed/chair exit alarm         Problem: Patient Education: Go to Patient Education Activity  Goal: Patient/Family Education  Outcome: Progressing Towards Goal

## 2022-03-10 NOTE — ROUTINE PROCESS
1900 - Bedside shift change report given to [address]', RN (oncoming nurse) by Kelly Buck RN (offgoing nurse). Report included the following information SBAR, Kardex, Intake/Output, MAR, Accordion and Recent Results. 2200 - TRANSFER - OUT REPORT:    Verbal report given to STACIE Hannah (name) on Topher Henson  being transferred to [address] [place] [address] (unit) for routine progression of care       Report consisted of patients Situation, Background, Assessment and   Recommendations(SBAR). Information from the following report(s) SBAR, Kardex, Intake/Output, MAR, Accordion and Recent Results was reviewed with the receiving nurse. Lines:   Peripheral IV 03/06/22 Left Antecubital (Active)   Site Assessment Clean, dry, & intact 03/09/22 1931   Phlebitis Assessment 0 03/09/22 1931   Infiltration Assessment 0 03/09/22 1931   Dressing Status Clean, dry, & intact 03/09/22 1931   Dressing Type Tape;Transparent 03/09/22 1931   Hub Color/Line Status Green;Capped 03/09/22 1931   Action Taken Open ports on tubing capped 03/09/22 1931   Alcohol Cap Used Yes 03/09/22 1931       Peripheral IV 03/08/22 Right Antecubital (Active)   Site Assessment Clean, dry, & intact 03/09/22 1931   Phlebitis Assessment 0 03/09/22 1931   Infiltration Assessment 0 03/09/22 1931   Dressing Status Old drainage; Intact 03/09/22 1931   Dressing Type Tape;Transparent 03/09/22 1931   Hub Color/Line Status Pink;Capped 03/09/22 1931   Action Taken Open ports on tubing capped 03/09/22 1931   Alcohol Cap Used Yes 03/09/22 1931        Opportunity for questions and clarification was provided.       Patient transported with:   O2 @ 4 liters  Registered Nurse  [facility]

## 2022-03-10 NOTE — PROGRESS NOTES
Problem: Mobility Impaired (Adult and Pediatric)  Goal: *Acute Goals and Plan of Care (Insert Text)  Description: FUNCTIONAL STATUS PRIOR TO ADMISSION: Patient was modified independent using a single point cane or rollator for functional mobility. Multiple falls in past year. 3-4 L home O2 during the day. HOME SUPPORT PRIOR TO ADMISSION: The patient lived with wife but did not require assist.  Called 911 with falls. Son in area    Physical Therapy Goals  Initiated 3/7/2022  1. Patient will move from supine to sit and sit to supine  in bed with modified independence within 7 day(s). 2.  Patient will transfer from bed to chair and chair to bed with minimal assistance/contact guard assist using the least restrictive device within 7 day(s). 3.  Patient will perform sit to stand with minimal assistance/contact guard assist within 7 day(s). 4.  Patient will ambulate with minimal assistance/contact guard assist for 25 feet with the least restrictive device within 7 day(s). Outcome: Progressing Towards Goal  Note:   PHYSICAL THERAPY TREATMENT  Patient: Daniel Gomes (10 y.o. male)  Date: 3/10/2022  Diagnosis: Acute respiratory failure with hypoxia ([facility]) [J96.01]  SIRS (systemic inflammatory response syndrome) ([facility]) [R65.10] <principal problem not specified>       Precautions: Fall  Chart, physical therapy assessment, plan of care and goals were reviewed. ASSESSMENT  Patient continues with skilled PT services and progressing towards goals. Pt alert and eager to participate with therapy, wife at bedside. Min A for sit<>stand from low sitting recliner. CGA with two bouts, seated rest between bouts, for gait training using RW and 3L supplemental O2 O2 sat 97-92%, denies increased dyspnea with activity.       Current Level of Function Impacting Discharge (mobility/balance): CGA/Min A     Other factors to consider for discharge:          PLAN :  Patient continues to benefit from skilled intervention to address the above impairments. Continue treatment per established plan of care. to address goals. Recommendation for discharge: (in order for the patient to meet his/her long term goals)  Therapy 3 hours per day 5-7 days per week    This discharge recommendation:  Has been made in collaboration with the attending provider and/or case management    IF patient discharges home will need the following DME: to be determined (TBD)       SUBJECTIVE:   Patient stated Agueda Or feel good.     OBJECTIVE DATA SUMMARY:   Critical Behavior:  Neurologic State: Alert  Orientation Level: Oriented X4  Cognition: Follows commands  Safety/Judgement: Awareness of environment  Functional Mobility Training:  Bed Mobility:                    Transfers:  Sit to Stand: Minimum assistance; Additional time;Assist x1  Stand to Sit: Contact guard assistance;Assist x1;Additional time                             Balance:  Sitting: Intact; With support  Standing: With support  Standing - Static: Constant support; Fair  Standing - Dynamic : Fair  Ambulation/Gait Training:  Distance (ft):  (20' and 60 ')  Assistive Device: Walker, rolling;Gait belt  Ambulation - Level of Assistance: Contact guard assistance;Minimal assistance;Assist x1;Additional time        Gait Abnormalities: Decreased step clearance        Base of Support: Widened     Speed/Amber: Pace decreased (<100 feet/min)                       Stairs: Therapeutic Exercises:     Pain Rating:  Denies pain    Activity Tolerance:   Good    After treatment patient left in no apparent distress:   Sitting in chair, Call bell within reach, and Caregiver / family present    COMMUNICATION/COLLABORATION:   The patients plan of care was discussed with: Registered nurse. Verner Crumble   Time Calculation: 35 mins

## 2022-03-10 NOTE — PROGRESS NOTES
3/10/2022   CARE MANAGEMENT NOTE:  Pt transferred from [facility] to the 5th floor. EMR reviewed and handoff received from previous  Abel Velarde). Pt was admitted with acute respiratory failure with hypoxia, COPD, and SVEN. Reportedly, pt resides with his wife Mellissa Akers ([phone]). RUR 8%    Transition Plan of Care:  1. Pulmonary following for medical management  2. PT/OT evals complete; pt ambulated 5 feet on 3/9 and IPR was recommended. Referrals were made to [facility] (denied), [facility] (denied), [facility] (out of network), and [facility] (accepted)  3. [employer]ARP Medicare insurance Teryl Gess was initiated on 3/10  4. Respiratory panel was negative on 3/8   5. Outpt f/u  6. Mode of transportation to be determined    CM will continue to follow pt for IPR  DAMIAN Barbour

## 2022-03-10 NOTE — PROGRESS NOTES
Problem: Self Care Deficits Care Plan (Adult)  Goal: *Acute Goals and Plan of Care (Insert Text)  Description: FUNCTIONAL STATUS PRIOR TO ADMISSION: Patient was modified independent for functional mobility, and ADLs. HOME SUPPORT: The patient lived with spouse, and with recent increased need for assist with tasks. Occupational Therapy Goals  Initiated 3/7/2022  1. Patient will perform lower body dressing with supervision/set-up within 7 day(s). 2.  Patient will perform bathing with supervision/set-up within 7 day(s). 3.  Patient will perform toilet transfers with minimal assistance/contact guard assist within 7 day(s). 4.  Patient will perform all aspects of toileting with minimal assistance/contact guard assist within 7 day(s). 5.  Patient will participate in upper extremity therapeutic exercise/activities with supervision/set-up for 10 minutes within 7 day(s). Outcome: Progressing Towards Goal   OCCUPATIONAL THERAPY TREATMENT  Patient: Trevor Linder (34 y.o. male)  Date: 3/10/2022  Diagnosis: Acute respiratory failure with hypoxia ([facility]) [J96.01]  SIRS (systemic inflammatory response syndrome) ([facility]) [R65.10] <principal problem not specified>       Precautions: Fall  Chart, occupational therapy assessment, plan of care, and goals were reviewed. ASSESSMENT  Patient continues with skilled OT services and is progressing towards goals. Pt received seated in chair on 3 L O2, eager to participate with therapy. Using RW pt ambulated to bathroom with CGA, required min A for sit>stand from recliner and toilet. He returned to chair to perform grooming ADLs with setup in sitting. Vitals monitored throughout session, O2 sats 92-95% on 3 L O2, pt with no c/o SOB. Pt is progressing towards goals, currently functioning below his mod I baseline and will continue to benefit from skilled intervention. Continue to recommend rehab at discharge.     Current Level of Function Impacting Discharge (ADLs): Min A transfers, setup-max A ADLs         PLAN :  Patient continues to benefit from skilled intervention to address the above impairments. Continue treatment per established plan of care to address goals. Recommendation for discharge: (in order for the patient to meet his/her long term goals)  Therapy 3 hours per day 5-7 days per week    This discharge recommendation:  Has been made in collaboration with the attending provider and/or case management    IF patient discharges home will need the following DME: TBD       SUBJECTIVE:   Patient stated Brittany Enter you for working with me.     OBJECTIVE DATA SUMMARY:   Cognitive/Behavioral Status:  Neurologic State: Alert  Orientation Level: Oriented X4  Cognition: Follows commands        Functional Mobility and Transfers for ADLs:  Transfers:  Sit to Stand: Minimum assistance; Additional time;Assist x1  Functional Transfers  Toilet Transfer : Minimum assistance; Adaptive equipment; Additional time       Balance:  Sitting: Intact; With support  Standing: With support  Standing - Static: Constant support; Fair  Standing - Dynamic : Fair    ADL Intervention:       Grooming  Position Performed: Seated in chair  Washing Face: Set-up  Brushing Teeth: Set-up       Pain:  Pt did not c/o pain    Activity Tolerance:   Fair and requires rest breaks    After treatment patient left in no apparent distress:   Sitting in chair and Call bell within reach    COMMUNICATION/COLLABORATION:   The patients plan of care was discussed with: Physical therapist and Registered nurse.      Leatha Garcia OT  Time Calculation: 23 mins

## 2022-03-10 NOTE — PROGRESS NOTES
Problem: Falls - Risk of  Goal: *Absence of Falls  Description: Document Vadim Bruce Fall Risk and appropriate interventions in the flowsheet.   3/9/2022 2316 by Caro Butler  Outcome: Progressing Towards Goal  Note: Fall Risk Interventions:  Mobility Interventions: Bed/chair exit alarm         Medication Interventions: Bed/chair exit alarm    Elimination Interventions: Call light in reach,Bed/chair exit alarm    History of Falls Interventions: Bed/chair exit alarm      3/9/2022 2250 by Brielle Mack  Outcome: Progressing Towards Goal  Note: Fall Risk Interventions:  Mobility Interventions: Bed/chair exit alarm         Medication Interventions: Bed/chair exit alarm    Elimination Interventions: Bed/chair exit alarm,Call light in reach    History of Falls Interventions: Bed/chair exit alarm         Problem: Patient Education: Go to Patient Education Activity  Goal: Patient/Family Education  3/9/2022 2316 by Caro Butler  Outcome: Progressing Towards Goal  3/9/2022 2250 by Brielle Mack  Outcome: Progressing Towards Goal

## 2022-03-10 NOTE — PROGRESS NOTES
[facility] [place] [facility]  [address] [facility], [place], [address] [place] [address]  [phone]      Medical Progress Note      NAME: Arian Meyer   :  1940  MRM:  [id number]    Date/Time: 3/10/2022  8:05 PM         Subjective:     Chief Complaint:  \"I'm okay\"     Pt seen and examined. Awaiting IPR placement. Feeling well. Down to 2L O2     ROS:  (bold if positive, if negative)           Objective:       Vitals:          Last 24hrs VS reviewed since prior progress note.  Most recent are:    Visit Vitals  BP (!) 131/58 (BP 1 Location: Right upper arm, BP Patient Position: Sitting)   Pulse 98   Temp 97.5 °F (36.4 °C)   Resp 18   Ht 6' 1\" (1.854 m)   Wt 97.9 kg (215 lb 12.8 oz)   SpO2 93%   BMI 28.47 kg/m²     SpO2 Readings from Last 6 Encounters:   03/10/22 93%    O2 Flow Rate (L/min): 2 l/min       Intake/Output Summary (Last 24 hours) at 3/10/2022 1428  Last data filed at 3/9/2022 1931  Gross per 24 hour   Intake 0 ml   Output --   Net 0 ml          Exam:     Physical Exam:    Gen:  Well-developed, well-nourished, in no acute distress  HEENT:  Pink conjunctivae, PERRL, hearing intact to voice, moist mucous membranes  Neck:  Supple, without masses, thyroid non-tender  Resp:  No accessory muscle use, clear breath sounds without wheezes rales or rhonchi  Card:  No murmurs, normal S1, S2 without thrills, bruits or peripheral edema  Abd:  Soft, non-tender, non-distended, normoactive bowel sounds are present  Musc:  No cyanosis or clubbing  Skin:  No rashes or ulcers, skin turgor is good  Neuro:  Cranial nerves 3-12 are grossly intact,  strength is 5/5 bilaterally and dorsi / plantarflexion is 5/5 bilaterally, follows commands appropriately  Psych:  Good insight, oriented to person, place and time, alert    Medications Reviewed: (see below)    Lab Data Reviewed: (see below)    ______________________________________________________________________    Medications:     Current Facility-Administered Medications   Medication Dose Route Frequency    amLODIPine (NORVASC) tablet 10 mg  10 mg Oral QHS    gabapentin (NEURONTIN) capsule 300 mg  300 mg Oral BID    atorvastatin (LIPITOR) tablet 10 mg  10 mg Oral QHS    albuterol-ipratropium (DUO-NEB) 2.5 MG-0.5 MG/3 ML  3 mL Nebulization Q4H PRN    hydrALAZINE (APRESOLINE) 20 mg/mL injection 10 mg  10 mg IntraVENous Q6H PRN    guaiFENesin ER (MUCINEX) tablet 600 mg  600 mg Oral Q12H    oxyCODONE IR (ROXICODONE) tablet 5 mg  5 mg Oral Q6H PRN    sodium chloride (NS) flush 5-40 mL  5-40 mL IntraVENous Q8H    sodium chloride (NS) flush 5-40 mL  5-40 mL IntraVENous PRN    acetaminophen (TYLENOL) tablet 650 mg  650 mg Oral Q6H PRN    Or    acetaminophen (TYLENOL) suppository 650 mg  650 mg Rectal Q6H PRN    polyethylene glycol (MIRALAX) packet 17 g  17 g Oral DAILY PRN    ondansetron (ZOFRAN ODT) tablet 4 mg  4 mg Oral Q8H PRN    Or    ondansetron (ZOFRAN) injection 4 mg  4 mg IntraVENous Q6H PRN    heparin (porcine) injection 5,000 Units  5,000 Units SubCUTAneous Q8H    melatonin tablet 3 mg  3 mg Oral QHS            Lab Review:     Recent Labs     03/09/22  0157   WBC 13.6*   HGB 14.0   HCT 44.5        Recent Labs     03/09/22  0157      K 4.7   CL 99   CO2 36*   *   BUN 24*   CREA 0.93   CA 10.0     No components found for: [place]         Assessment / Plan:   Hypoxia - unclear etiology. ?2/2 R hemidiaphragm elevation. ?COPD though NOT acute exacerbation, ?post COVID etiology   -proBNP not elevated   -VRP/COVID negative   -CPAP qHS   -continue O2; wean as able   -on prednisone   -pulm on board   -outpt PFT's once improved     AMS - likely 2/2 hypercarbia. Episode occurred after woke from a nap and CODE ATLAS had to be called.  Suspect 2/2 SVEN, obesity hypoventilation   -CPAP qHS   -NIF - 33; not surprising considering R hemidiaphragm     XOL   -on statin     HTN  -on amlodipine     Abnormal UA - did have trace leuk est and 10-20 WBC but a few epis and bacteria were negative   -s/p Cef  -no cultures sent; unlikely UTI     Weakness   -PT. OT on board  -likely IPR; pending placement     Total time spent with patient: 28 [phone] discussed with: Patient and Family    Discussed:  Care Plan    Prophylaxis:  Lovenox    Disposition:  Pending IPR           ___________________________________________________    Attending Physician: Elyssa Harper MD

## 2022-03-10 NOTE — PROGRESS NOTES
Bedside, Verbal and Written shift change report given to [address] [place] [address] (oncoming nurse) by Liane Nj (offgoing nurse). Report included the following information SBAR, Kardex, ED Summary, Procedure Summary, Intake/Output, MAR, Recent Results and Med Rec Status.

## 2022-03-10 NOTE — PROGRESS NOTES
Name: [facility] SYSTEM: [facility]   : 1940 Admit Date: 3/6/2022   Phone:   Room: 521/01   PCP: Senia Grissom  MRN: [id number]   Date: 3/10/2022  Code: Full Code          Chart and notes reviewed. Data reviewed. I review the patient's current medications in the medical record at each encounter. I have evaluated and examined the patient. HPI:    8:33 AM       History was obtained from patient. I was asked by Noris Steiner DO to see Topher Henson in consultation for a chief complaint of acute on chronic respiratory failure and elevated R hemidiaphragm. History of Present Illness:  Mr. Eli Wren is a 79 yo gentleman with a history of COPD (moderately severe obstruction, FEV1 1.60L, 52% in 2019), chronic respiratory failure with hypoxia, SVEN (noncompliant with CPAP), and elevated R hemidiaphragm who is admitted for ataxia and confusion. He is followed by both the Pulmonary and Sleep Divisions of [facility]. He was last seen in the Pulmonary clinic by Dr. Gregg Kinsey in 2020. He was initially referred to us for R hemidiaphragm elevation that was discovered in . He denies known trauma or surgeries. He had PFTs that showed moderately severe obstruction and was found to be hypoxic as well as to have SVEN. He did not note benefit from long acting inhalers and did not continue them. He did not tolerated his CPAP and was not wearing O2 as he felt he did not need/tolerate it. He did not follow-up with pulmonary as planned. He did see sleep as recently as 2022 and at that time O2 had been restarted by his PCP, but he was not willing try his CPAP again at that time. He comes in with confusion and falls. He sats were in the 70's on 6L initially and has required up to 8L to maintain his sats (is on 2-3L at home).      Labs reviewed: WBC 15.5, Hgb 14.3, , HCO2 31 (initially 37), cr 0.98,     Images reviewed:  CXR 3/6/2022 with chronic R hemidiaphragm elevation and associated atelectasis    Interval history:    Afebrile  Bp soft  Sats 93% on 2L--better  Did not wear CPAP overnight  WBC 13.6--decreased  Blood cultures negative x 4 days  ECHO 3/9:  LV normal, unable to assess wal motion, normal left ventricular systolic function  RVP negative  Pro-  CTA 3/8:  No evidence for pulmonary embolism. Diffuse mild interstitial process could represent interstitial edema or an  atypical or viral pneumonia Chronic elevation of the right hemidiaphragm    ROS:  Feels great today. Wants to go to rehab. Denies cough, SOB. Sitting up in chair.         Social History     Tobacco Use    Smoking status: Not on file    Smokeless tobacco: Not on file   Substance Use Topics    Alcohol use: Not on file       No Known Allergies    Current Facility-Administered Medications   Medication Dose Route Frequency    amLODIPine (NORVASC) tablet 10 mg  10 mg Oral QHS    gabapentin (NEURONTIN) capsule 300 mg  300 mg Oral BID    atorvastatin (LIPITOR) tablet 10 mg  10 mg Oral QHS    albuterol-ipratropium (DUO-NEB) 2.5 MG-0.5 MG/3 ML  3 mL Nebulization Q4H PRN    hydrALAZINE (APRESOLINE) 20 mg/mL injection 10 mg  10 mg IntraVENous Q6H PRN    guaiFENesin ER (MUCINEX) tablet 600 mg  600 mg Oral Q12H    oxyCODONE IR (ROXICODONE) tablet 5 mg  5 mg Oral Q6H PRN    sodium chloride (NS) flush 5-40 mL  5-40 mL IntraVENous Q8H    sodium chloride (NS) flush 5-40 mL  5-40 mL IntraVENous PRN    acetaminophen (TYLENOL) tablet 650 mg  650 mg Oral Q6H PRN    Or    acetaminophen (TYLENOL) suppository 650 mg  650 mg Rectal Q6H PRN    polyethylene glycol (MIRALAX) packet 17 g  17 g Oral DAILY PRN    ondansetron (ZOFRAN ODT) tablet 4 mg  4 mg Oral Q8H PRN    Or    ondansetron (ZOFRAN) injection 4 mg  4 mg IntraVENous Q6H PRN    heparin (porcine) injection 5,000 Units  5,000 Units SubCUTAneous Q8H    melatonin tablet 3 mg  3 mg Oral QHS         REVIEW OF SYSTEMS   12 point ROS negative except as stated in the HPI. Physical Exam:   Visit Vitals  /66 (BP 1 Location: Right upper arm, BP Patient Position: At rest;Sitting)   Pulse 98   Temp 97.8 °F (36.6 °C)   Resp 16   Ht 6' 1\" (1.854 m)   Wt 97.9 kg (215 lb 12.8 oz)   SpO2 93%   BMI 28.47 kg/m²       General:  Alert, cooperative, no distress, appears stated age, pleasant   Head:  Normocephalic, without obvious abnormality, atraumatic. Eyes:  Conjunctivae/corneas clear. Nose: Nares normal. Septum midline   Throat: Lips, mucosa, and tongue normal.    Neck: Supple, symmetrical, trachea midline   Lungs:   CTA   Chest wall:  No tenderness or deformity. Heart:  Regular rate and rhythm, S1, S2 normal, no murmur, click, rub or gallop. Abdomen:   Soft, non-tender. Bowel sounds normal.   Extremities: Extremities normal, atraumatic, no cyanosis or edema. Pulses: 2+ and symmetric all extremities. Skin: Skin color, texture, turgor normal. No rashes or lesions       Neurologic: Grossly nonfocal       Lab Results   Component Value Date/Time    Sodium 137 03/09/2022 01:57 AM    Potassium 4.7 03/09/2022 01:57 AM    Chloride 99 03/09/2022 01:57 AM    CO2 36 (H) 03/09/2022 01:57 AM    BUN 24 (H) 03/09/2022 01:57 AM    Creatinine 0.93 03/09/2022 01:57 AM    Glucose 102 (H) 03/09/2022 01:57 AM    Calcium 10.0 03/09/2022 01:57 AM    Lactic acid 0.8 03/06/2022 04:38 PM       Lab Results   Component Value Date/Time    WBC 13.6 (H) 03/09/2022 01:57 AM    HGB 14.0 03/09/2022 01:57 AM    PLATELET 195 55/46/5509 01:57 AM    MCV 97.4 03/09/2022 01:57 AM       Lab Results   Component Value Date/Time    Alk.  phosphatase 116 03/06/2022 04:38 PM    Protein, total 8.3 (H) 03/06/2022 04:38 PM    Albumin 3.2 (L) 03/06/2022 04:38 PM    Globulin 5.1 (H) 03/06/2022 04:38 PM       No results found for: IRON, FE, TIBC, IBCT, PSAT, FERR    Lab Results   Component Value Date/Time    TSH 0.60 03/07/2022 05:04 AM        No results found for: PH, PHI, PCO2, PCO2I, PO2, PO2I, HCO3, HCO3I, FIO2, FIO2I    No results found for: CPK, RCK1, RCK2, RCK3, RCK4, CKNDX, CKND1, TROPT, TROIQ, BNPP, BNP     Lab Results   Component Value Date/Time    Culture result: NO GROWTH 4 DAYS 03/06/2022 04:38 PM       No results found for: TOXA1, RPR, HBCM, HBSAG, HAAB, HCAB1, HAAT, G6PD, CRYAC, HIVGT, HIVR, HIV1, HIV12, HIVPC, HIVRPI    No results found for: VANCT, CPK    Lab Results   Component Value Date/Time    Color YELLOW/STRAW 03/06/2022 05:41 PM    Appearance CLEAR 03/06/2022 05:41 PM    pH (UA) 5.0 03/06/2022 05:41 PM    Protein 30 (A) 03/06/2022 05:41 PM    Glucose Negative 03/06/2022 05:41 PM    Ketone Negative 03/06/2022 05:41 PM    Bilirubin Negative 03/06/2022 05:41 PM    Blood Negative 03/06/2022 05:41 PM    Urobilinogen 0.2 03/06/2022 05:41 PM    Nitrites Negative 03/06/2022 05:41 PM    Leukocyte Esterase TRACE (A) 03/06/2022 05:41 PM    WBC 10-20 03/06/2022 05:41 PM    RBC 5-10 03/06/2022 05:41 PM    Bacteria Negative 03/06/2022 05:41 PM       IMPRESSION  · Acute on chronic respiratory failure with hypoxia  · SVEN, noncompliant with CPAP  · COPD, does not appear to be in exacerbation  · Abnormal Chest CT: with edema vs. Atypical pneumonia. Of note, he was sick about a month ago, however had 2 negative Covid tests (one PCR, one rapid)  · Chronic R hemidiaphragm elevation    PLAN  · Goal sats 88% or higher, wean O2 as tolerated   · Discussed with him that not wearing CPAP is contributing to his chronic hypoxia and that we will likely have difficulty weaning O2 if he is not wearing this. He needs outpatient sleep follow-up with repeat study; may tolerate BiPap better. · PRN duonebs, would benefit from a trying additional long acting inhalers as an outpatient  · Steroids complete     is stable from a pulmonary standpoint. We will sign off and arrange for outpatient follow-up in 2-3 weeks. Please call with questions.       Jarred Vazquez NP

## 2022-03-11 LAB
BACTERIA SPEC CULT: NORMAL
SERVICE CMNT-IMP: NORMAL

## 2022-03-11 PROCEDURE — 97116 GAIT TRAINING THERAPY: CPT

## 2022-03-11 PROCEDURE — 74011250637 HC RX REV CODE- 250/637: Performed by: INTERNAL MEDICINE

## 2022-03-11 PROCEDURE — 77010033678 HC OXYGEN DAILY

## 2022-03-11 PROCEDURE — 74011250636 HC RX REV CODE- 250/636: Performed by: INTERNAL MEDICINE

## 2022-03-11 PROCEDURE — 74011250637 HC RX REV CODE- 250/637: Performed by: FAMILY MEDICINE

## 2022-03-11 PROCEDURE — 97530 THERAPEUTIC ACTIVITIES: CPT

## 2022-03-11 PROCEDURE — 65660000000 HC RM CCU STEPDOWN

## 2022-03-11 PROCEDURE — 94761 N-INVAS EAR/PLS OXIMETRY MLT: CPT

## 2022-03-11 PROCEDURE — 74011000250 HC RX REV CODE- 250: Performed by: INTERNAL MEDICINE

## 2022-03-11 RX ORDER — MICONAZOLE NITRATE 2 %
POWDER (GRAM) TOPICAL 2 TIMES DAILY
Status: DISCONTINUED | OUTPATIENT
Start: 2022-03-11 | End: 2022-03-15 | Stop reason: HOSPADM

## 2022-03-11 RX ORDER — BACITRACIN ZINC 500 UNIT/G
OINTMENT (GRAM) TOPICAL 2 TIMES DAILY
Status: DISCONTINUED | OUTPATIENT
Start: 2022-03-11 | End: 2022-03-15 | Stop reason: HOSPADM

## 2022-03-11 RX ADMIN — MICONAZOLE NITRATE 2 % TOPICAL POWDER: at 14:15

## 2022-03-11 RX ADMIN — ATORVASTATIN CALCIUM 10 MG: 10 TABLET, FILM COATED ORAL at 21:20

## 2022-03-11 RX ADMIN — GUAIFENESIN 600 MG: 600 TABLET ORAL at 09:05

## 2022-03-11 RX ADMIN — SODIUM CHLORIDE, PRESERVATIVE FREE 10 ML: 5 INJECTION INTRAVENOUS at 15:16

## 2022-03-11 RX ADMIN — HEPARIN SODIUM 5000 UNITS: 5000 INJECTION INTRAVENOUS; SUBCUTANEOUS at 05:22

## 2022-03-11 RX ADMIN — HEPARIN SODIUM 5000 UNITS: 5000 INJECTION INTRAVENOUS; SUBCUTANEOUS at 21:20

## 2022-03-11 RX ADMIN — GABAPENTIN 300 MG: 300 CAPSULE ORAL at 17:45

## 2022-03-11 RX ADMIN — MICONAZOLE NITRATE 2 % TOPICAL POWDER: at 18:24

## 2022-03-11 RX ADMIN — SODIUM CHLORIDE, PRESERVATIVE FREE 10 ML: 5 INJECTION INTRAVENOUS at 05:22

## 2022-03-11 RX ADMIN — SODIUM CHLORIDE, PRESERVATIVE FREE 10 ML: 5 INJECTION INTRAVENOUS at 21:25

## 2022-03-11 RX ADMIN — BACITRACIN ZINC: 500 OINTMENT TOPICAL at 18:23

## 2022-03-11 RX ADMIN — BACITRACIN ZINC: 500 OINTMENT TOPICAL at 14:15

## 2022-03-11 RX ADMIN — AMLODIPINE BESYLATE 10 MG: 5 TABLET ORAL at 21:19

## 2022-03-11 RX ADMIN — Medication 3 MG: at 21:19

## 2022-03-11 RX ADMIN — GUAIFENESIN 600 MG: 600 TABLET ORAL at 21:20

## 2022-03-11 RX ADMIN — HEPARIN SODIUM 5000 UNITS: 5000 INJECTION INTRAVENOUS; SUBCUTANEOUS at 15:14

## 2022-03-11 RX ADMIN — GABAPENTIN 300 MG: 300 CAPSULE ORAL at 09:05

## 2022-03-11 NOTE — PROGRESS NOTES
Bedside and Verbal shift change report given to [address] [place] [address] (oncoming nurse) by Anabela Hoffman RN (offgoing nurse). Report included the following information SBAR, Kardex, Intake/Output, MAR and Recent Results.

## 2022-03-11 NOTE — PROGRESS NOTES
Pt noted to have irritation behind left ear. This RN assessed area, skin noted to be red, irritated and skin breakdown. Wound consult order placed. Optifoam placed behind left ear. Bedside shift change report given to Inessa España (oncoming nurse) by Elton Cerda (offgoing nurse). Report included the following information SBAR, Kardex, Intake/Output, MAR, Recent Results and Med Rec Status.

## 2022-03-11 NOTE — WOUND CARE
Wound Consult:  New Patient Visit. Chart reviewed. Consulted for left ear. Spoke with patients nurse,  Miguelina Ring and we were at bedside together. Patient is resting on a Yissel bed with hercules mattress; when in bed; but was up on recliner at time of visit. Patient is alert and oriented x 4; assist of two to stand with walker. Lavelle score 20. His wife is at bedside. Assessment:  Left ear - top - friction injury/skin irritation from rubbing of O2 tubing. Pink/red, slightly abraded 0.2 x 0.3 cm. No injury to right ear. He has a large abdominal hernia that hangs down like a pannus - slight odor / moisture with rash under this without skin breakdown. Perianal area - pink/red, moisture related, applied barrier cream.  No redness or injury to heels. Treatment:  Soft grey ear protectors applied to oxygen tubing, 2x2 over the affected area. Waffle cushion placed in chair. DriFlo sheet under pannus. Wound Recommendations:  Soft grey ear protectors applied to oxygen tubing, apply small amount of Bacitracin with soft 2x2 over the affected area on left ear and tubing with foam protector keeps in place. Waffle cushion placed in chair. DriFlo sheet under pannus and begin antifungal powder. Skin Care / PI Prevention Recommendations:  1. Minimize friction/shear: minimize layers of linen/pads under patient. 2. Off load pressure/reposition:  turn and reposition approximately every 2 hours; float heels with pillows or use off loading heel boots; waffle cushion for sitting. 3. Manage Moisture - keep skin folds dry; incontinence skin care with incontinence wipes; add barrier ointment as needed; DriFlo sheets/antifungal powder. 4. Continue to monitor nutrition, pain, and skin risk scale, and skin assessment. Plan:  Hand off to Dr. Yessica Riley regarding findings and proposed orders for treatment. We will continue to reassess routinely and as needed.   Please re-consult should concerns arise despite continued skin/PI prevention measures.     Anni Lynn, MSN, RN, [facility], Wound / [address] [facility] Office [phone]

## 2022-03-11 NOTE — PROGRESS NOTES
Problem: Mobility Impaired (Adult and Pediatric)  Goal: *Acute Goals and Plan of Care (Insert Text)  Description: FUNCTIONAL STATUS PRIOR TO ADMISSION: Patient was modified independent using a single point cane or rollator for functional mobility. Multiple falls in past year. 3-4 L home O2 during the day. HOME SUPPORT PRIOR TO ADMISSION: The patient lived with wife but did not require assist.  Called 911 with falls. Son in area    Physical Therapy Goals  Initiated 3/7/2022  1. Patient will move from supine to sit and sit to supine  in bed with modified independence within 7 day(s). 2.  Patient will transfer from bed to chair and chair to bed with minimal assistance/contact guard assist using the least restrictive device within 7 day(s). 3.  Patient will perform sit to stand with minimal assistance/contact guard assist within 7 day(s). 4.  Patient will ambulate with minimal assistance/contact guard assist for 25 feet with the least restrictive device within 7 day(s). Note:   PHYSICAL THERAPY TREATMENT  Patient: Maeve Fuentes (36 y.o. male)  Date: 3/11/2022  Diagnosis: Acute respiratory failure with hypoxia ([facility]) [J96.01]  SIRS (systemic inflammatory response syndrome) ([facility]) [R65.10] <principal problem not specified>       Precautions: Fall  Chart, physical therapy assessment, plan of care and goals were reviewed. ASSESSMENT  Patient continues with skilled PT services. Pt sit to stand with min assist.Pt ambulated 60ft with RW CGA. Pt de-sats 86% SPO2 on RA with mobility. Pt given 2L during ambulation to return to 90%range. See graph below. Pt left sitting. Pt progressing slowly. Continue goals. PLAN :  Patient continues to benefit from skilled intervention to address the above impairments. Continue treatment per established plan of care. to address goals.     Recommendation for discharge: (in order for the patient to meet his/her long term goals)  Therapy 3 hours per day 5-7 days per week    This discharge recommendation:  Has been made in collaboration with the attending provider and/or case management    IF patient discharges home will need the following DME: rolling walker       SUBJECTIVE:       OBJECTIVE DATA SUMMARY:   Critical Behavior:  Neurologic State: Alert  Orientation Level: Oriented X4  Cognition: Follows commands  Safety/Judgement: Awareness of environment  Functional Mobility Training:       Transfers:  Sit to Stand: Minimum assistance  Stand to Sit: Contact guard assistance                             Balance:  Sitting: Intact  Standing: With support  Standing - Static: Fair  Ambulation/Gait Training:  Distance (ft): 60 Feet (ft)  Assistive Device: Gait belt;Walker, rolling  Ambulation - Level of Assistance: Contact guard assistance        Gait Abnormalities: Decreased step clearance        Base of Support: Narrowed     Speed/Amber: Pace decreased (<100 feet/min)  Step Length: Right shortened;Left shortened         Documentation for home O2:     ROOM AIR    AT REST   O2 SATS  96%    ROOM AIR WITH ACTIVITY 02 SATS  86%    (2   ) LITERS OF O2 WITH ACTIVITY O2 SATS  90%    (2 )LITERS OF 02 PATIENT LEFT COMFORTABLY  SITTING/SUPINE 02 SATS  91%              Activity Tolerance:   Fair    After treatment patient left in no apparent distress:   Sitting in chair    COMMUNICATION/COLLABORATION:   The patients plan of care was discussed with: Physical therapist.     Maria Billings PTA   Time Calculation: 23 mins

## 2022-03-11 NOTE — PROGRESS NOTES
Problem: Falls - Risk of  Goal: *Absence of Falls  Description: Document Bruce Trujillo Fall Risk and appropriate interventions in the flowsheet.   Outcome: Progressing Towards Goal  Note: Fall Risk Interventions:  Mobility Interventions: Bed/chair exit alarm         Medication Interventions: Bed/chair exit alarm    Elimination Interventions: Call light in reach,Bed/chair exit alarm    History of Falls Interventions: Bed/chair exit alarm         Problem: Patient Education: Go to Patient Education Activity  Goal: Patient/Family Education  Outcome: Progressing Towards Goal     Problem: Patient Education: Go to Patient Education Activity  Goal: Patient/Family Education  Outcome: Progressing Towards Goal

## 2022-03-11 NOTE — PROGRESS NOTES
Chart reviewed. Attempted to see pt for OT, however, pt eating lunch. Will f/u later today if able. Thanks!

## 2022-03-11 NOTE — PROGRESS NOTES
Comprehensive Nutrition Assessment    Type and Reason for Visit: Initial,RD nutrition re-screen/LOS    Nutrition Recommendations/Plan:   1. Continue regular, 2 gm Na diet   2. Provide Ensure High Protein once daily (160 kcal, 19 g carbs, 16 g protein)    3. No BM x 4 days - consider modification to bowel regimen     Nutrition Assessment:    Pt is a 80year old male admitted with Acute respiratory failure with hypoxia; SIRS. He  has no past medical history on file. Wife at bedside, both patient and spouse provided information. NKFA. No c/o N/V/D. No chewing/swallowing problems. State #, but is unsure when he last weighed this much. Current weight is a 1.9% loss from UBW. While PO intake is documented as averaging 25% of all meals, patient endorses wife and family bringing in outside foods. Patient has been consuming 100% of [employer] combo meals multiple times throughout visit. RD explained to patient that this does not fit his low sodium restriction, and that he is on this specific diet order for a reason. Patient voiced understanding. States he is drinking 100% of ordered supplement. No complaints at this time. Patient on 3L O2.      Patient Vitals for the past 168 hrs:   % Diet Eaten   03/09/22 1931 0%   03/09/22 1102 26 - 50%   03/09/22 0819 1 - 25%   03/08/22 1532 1 - 25%   03/08/22 0935 1 - 25%       Wt Readings from Last 10 Encounters:   03/09/22 97.9 kg (215 lb 12.8 oz)     Last 3 Recorded Weights in this Encounter    03/09/22 0419 03/09/22 0804 03/09/22 2257   Weight: 96 kg (211 lb 10.3 oz) 96 kg (211 lb 10.3 oz) 97.9 kg (215 lb 12.8 oz)       Malnutrition Assessment:  Malnutrition Status:  No malnutrition    Context:  Acute illness     Findings of the 6 clinical characteristics of malnutrition:   Energy Intake:  No significant decrease in energy intake  Weight Loss:  Unable to assess     Body Fat Loss:  No significant body fat loss,     Muscle Mass Loss:  No significant muscle mass loss,    Fluid Accumulation:  No significant fluid accumulation,     Strength:  Not performed     Estimated Daily Nutrient Needs:  Energy (kcal): 2089 (MSJ x 1.2); Weight Used for Energy Requirements: Current  Protein (g):  (1.0-1.2 g/kg); Weight Used for Protein Requirements: Current  Fluid (ml/day): 2089; Method Used for Fluid Requirements: 1 ml/kcal    Nutrition Related Findings:    ABD: WDL  Last BM: 3/7  Edema: Genital: 4+ (3/10/2022  8:00 PM)    Nutr. Labs:  Lab Results   Component Value Date/Time    GFR est AA >60 03/09/2022 01:57 AM    GFR est non-AA >60 03/09/2022 01:57 AM    Creatinine 0.93 03/09/2022 01:57 AM    BUN 24 (H) 03/09/2022 01:57 AM    Sodium 137 03/09/2022 01:57 AM    Potassium 4.7 03/09/2022 01:57 AM    Chloride 99 03/09/2022 01:57 AM    CO2 36 (H) 03/09/2022 01:57 AM     Lab Results   Component Value Date/Time    Glucose 102 (H) 03/09/2022 01:57 AM     No results found for: HBA1C, CPO5XTWL, CKX7XMVQ, ZIW9KDGF    Nutr. Meds:  Norvasc, lipitor, heparin, zofran PRN, miralax PRN    Wounds:    None       Current Nutrition Therapies:  ADULT DIET Regular; Low Sodium (2 gm)  ADULT ORAL NUTRITION SUPPLEMENT Dinner; Low Calorie/High Protein    Anthropometric Measures:  · Height:  6' 1\" (185.4 cm)  · Current Body Wt:  97.9 kg (215 lb 13.3 oz)   · Admission Body Wt:  215 lb    · Usual Body Wt:  99.8 kg (220 lb)     · Ideal Body Wt:  184 lbs:  117.3 %   Body mass index is 28.47 kg/m². · BMI Category: Overweight (BMI 25.0-29. 9)       Nutrition Diagnosis:   · Not ready for diet/lifestyle change related to food and nutrition related knowledge deficit as evidenced by  (limited adherence to nutrition-related recommedations)    Nutrition Interventions:   Food and/or Nutrient Delivery: Continue current diet,Continue oral nutrition supplement  Nutrition Education and Counseling: No recommendations at this time  Coordination of Nutrition Care: Continue to monitor while inpatient,Interdisciplinary rounds    Goals:  PO intake >/= 50% of all meals and no longer consuming outside fast food within 3 - 5 days       Nutrition Monitoring and Evaluation:   Behavioral-Environmental Outcomes: Knowledge or skill,Readiness for change  Food/Nutrient Intake Outcomes: Food and nutrient intake,Supplement intake  Physical Signs/Symptoms Outcomes: Biochemical data,Skin,Weight,Fluid status or edema    Discharge Planning:    Continue oral nutrition supplement,Recommend pursue outpatient nutrition counseling     Electronically signed by Beverly Epley, RD on 3/93/3430   Contact: [phone] or via [employer]

## 2022-03-11 NOTE — PROGRESS NOTES
3/11/2022   CARE MANAGEMENT NOTE: CM reviewed EMR. Pt was admitted with acute respiratory failure with hypoxia, COPD, and SVEN. Reportedly, pt resides with his wife Clarisa Petty ([phone]).    RUR 8%; LOS 4 days     Transition Plan of Care:  1. Pulmonary following for medical management  2. [address] accepted  3. [employer] Medicare insurance Leanor Laming was initiated on 3/10  4. Respiratory panel was negative on 3/8   5. Outpt f/u  6. Mode of transportation to be determined     CM will continue to follow pt for IPR  D. Kelsie Sawyer

## 2022-03-11 NOTE — PROGRESS NOTES
Bedside shift change report given to Braydon Mariscal (oncoming nurse) by Stephanie Goodrich RN (offgoing nurse). Report included the following information SBAR, Kardex, Intake/Output, MAR, Accordion and Recent Results.

## 2022-03-12 PROCEDURE — 94761 N-INVAS EAR/PLS OXIMETRY MLT: CPT

## 2022-03-12 PROCEDURE — 74011250637 HC RX REV CODE- 250/637: Performed by: INTERNAL MEDICINE

## 2022-03-12 PROCEDURE — 65660000000 HC RM CCU STEPDOWN

## 2022-03-12 PROCEDURE — 74011250637 HC RX REV CODE- 250/637: Performed by: FAMILY MEDICINE

## 2022-03-12 PROCEDURE — 77030029684 HC NEB SM VOL KT MONA -A

## 2022-03-12 PROCEDURE — 74011000250 HC RX REV CODE- 250: Performed by: HOSPITALIST

## 2022-03-12 PROCEDURE — 74011000250 HC RX REV CODE- 250: Performed by: INTERNAL MEDICINE

## 2022-03-12 PROCEDURE — 94640 AIRWAY INHALATION TREATMENT: CPT

## 2022-03-12 PROCEDURE — 74011250636 HC RX REV CODE- 250/636: Performed by: INTERNAL MEDICINE

## 2022-03-12 PROCEDURE — 77010033678 HC OXYGEN DAILY

## 2022-03-12 RX ORDER — ARFORMOTEROL TARTRATE 15 UG/2ML
15 SOLUTION RESPIRATORY (INHALATION)
Status: DISCONTINUED | OUTPATIENT
Start: 2022-03-12 | End: 2022-03-15 | Stop reason: HOSPADM

## 2022-03-12 RX ADMIN — SODIUM CHLORIDE, PRESERVATIVE FREE 10 ML: 5 INJECTION INTRAVENOUS at 21:36

## 2022-03-12 RX ADMIN — AMLODIPINE BESYLATE 10 MG: 5 TABLET ORAL at 21:34

## 2022-03-12 RX ADMIN — BACITRACIN ZINC: 500 OINTMENT TOPICAL at 11:02

## 2022-03-12 RX ADMIN — SODIUM CHLORIDE, PRESERVATIVE FREE 10 ML: 5 INJECTION INTRAVENOUS at 17:20

## 2022-03-12 RX ADMIN — Medication 3 MG: at 21:34

## 2022-03-12 RX ADMIN — GABAPENTIN 300 MG: 300 CAPSULE ORAL at 17:18

## 2022-03-12 RX ADMIN — OXYCODONE 5 MG: 5 TABLET ORAL at 21:34

## 2022-03-12 RX ADMIN — ATORVASTATIN CALCIUM 10 MG: 10 TABLET, FILM COATED ORAL at 21:34

## 2022-03-12 RX ADMIN — GUAIFENESIN 600 MG: 600 TABLET ORAL at 21:35

## 2022-03-12 RX ADMIN — MICONAZOLE NITRATE 2 % TOPICAL POWDER: at 17:19

## 2022-03-12 RX ADMIN — ARFORMOTEROL TARTRATE 15 MCG: 15 SOLUTION RESPIRATORY (INHALATION) at 20:38

## 2022-03-12 RX ADMIN — HEPARIN SODIUM 5000 UNITS: 5000 INJECTION INTRAVENOUS; SUBCUTANEOUS at 06:57

## 2022-03-12 RX ADMIN — HEPARIN SODIUM 5000 UNITS: 5000 INJECTION INTRAVENOUS; SUBCUTANEOUS at 21:35

## 2022-03-12 RX ADMIN — MICONAZOLE NITRATE 2 % TOPICAL POWDER: at 11:02

## 2022-03-12 RX ADMIN — SODIUM CHLORIDE, PRESERVATIVE FREE 10 ML: 5 INJECTION INTRAVENOUS at 06:59

## 2022-03-12 RX ADMIN — HEPARIN SODIUM 5000 UNITS: 5000 INJECTION INTRAVENOUS; SUBCUTANEOUS at 17:18

## 2022-03-12 RX ADMIN — GUAIFENESIN 600 MG: 600 TABLET ORAL at 10:48

## 2022-03-12 RX ADMIN — BACITRACIN ZINC: 500 OINTMENT TOPICAL at 17:19

## 2022-03-12 RX ADMIN — OXYCODONE 5 MG: 5 TABLET ORAL at 03:42

## 2022-03-12 RX ADMIN — GABAPENTIN 300 MG: 300 CAPSULE ORAL at 10:48

## 2022-03-12 NOTE — PROGRESS NOTES
Barrera Smallelsen [address]  [address]  [phone]      Medical Progress Note      NAME: Lorie Jimenez   :  1940  MRM:  [id number]    Date/Time: 3/11/2022         Subjective:     Chief Complaint:  \"I'm okay\"     Pt seen and examined. Awaiting IPR placement. Feeling well. Down to 2L O2     ROS:  (bold if positive, if negative)           Objective:       Vitals:          Last 24hrs VS reviewed since prior progress note.  Most recent are:    Temp 97.5  HR 99  /73  RR 18   90% on 3L       Exam:     Physical Exam:    Gen:  Well-developed, well-nourished, in no acute distress  HEENT:  Pink conjunctivae, PERRL, hearing intact to voice, moist mucous membranes  Neck:  Supple, without masses, thyroid non-tender  Resp:  No accessory muscle use, clear breath sounds without wheezes rales or rhonchi  Card:  No murmurs, normal S1, S2 without thrills, bruits or peripheral edema  Abd:  Soft, non-tender, non-distended, normoactive bowel sounds are present  Musc:  No cyanosis or clubbing  Skin:  No rashes or ulcers, skin turgor is good  Neuro:  Cranial nerves 3-12 are grossly intact,  strength is 5/5 bilaterally and dorsi / plantarflexion is 5/5 bilaterally, follows commands appropriately  Psych:  Good insight, oriented to person, place and time, alert    Medications Reviewed: (see below)    Lab Data Reviewed: (see below)    ______________________________________________________________________    Medications:     Current Facility-Administered Medications   Medication Dose Route Frequency    arformoteroL (BROVANA) neb solution 15 mcg  15 mcg Nebulization BID RT    miconazole (MICOTIN) 2 % powder   Topical BID    bacitracin zinc (BACITRACIN) 500 unit/gram ointment   Topical BID    amLODIPine (NORVASC) tablet 10 mg  10 mg Oral QHS    gabapentin (NEURONTIN) capsule 300 mg  300 mg Oral BID    atorvastatin (LIPITOR) tablet 10 mg  10 mg Oral QHS    albuterol-ipratropium (DUO-NEB) 2.5 MG-0.5 MG/3 ML  3 mL Nebulization Q4H PRN    hydrALAZINE (APRESOLINE) 20 mg/mL injection 10 mg  10 mg IntraVENous Q6H PRN    guaiFENesin ER (MUCINEX) tablet 600 mg  600 mg Oral Q12H    oxyCODONE IR (ROXICODONE) tablet 5 mg  5 mg Oral Q6H PRN    sodium chloride (NS) flush 5-40 mL  5-40 mL IntraVENous Q8H    sodium chloride (NS) flush 5-40 mL  5-40 mL IntraVENous PRN    acetaminophen (TYLENOL) tablet 650 mg  650 mg Oral Q6H PRN    Or    acetaminophen (TYLENOL) suppository 650 mg  650 mg Rectal Q6H PRN    polyethylene glycol (MIRALAX) packet 17 g  17 g Oral DAILY PRN    ondansetron (ZOFRAN ODT) tablet 4 mg  4 mg Oral Q8H PRN    Or    ondansetron (ZOFRAN) injection 4 mg  4 mg IntraVENous Q6H PRN    heparin (porcine) injection 5,000 Units  5,000 Units SubCUTAneous Q8H    melatonin tablet 3 mg  3 mg Oral QHS            Lab Review:     No results for input(s): WBC, HGB, HCT, PLT, HGBEXT, HCTEXT, PLTEXT, HGBEXT, HCTEXT, PLTEXT in the last 72 hours. No results for input(s): NA, K, CL, CO2, GLU, BUN, CREA, CA, MG, PHOS, ALB, TBIL, ALT, INR, INREXT, INREXT in the last 72 hours. No lab exists for component: SGOT  No components found for: [place]         Assessment / Plan:   Hypoxia - unclear etiology. ?2/2 R hemidiaphragm elevation. ?COPD though NOT acute exacerbation, ?post COVID etiology   -proBNP not elevated   -VRP/COVID negative   -CPAP qHS   -continue O2; will need O2 at discharge   -on prednisone wean at discharge   -pulm on board   -outpt PFT's once improved     AMS - likely 2/2 hypercarbia. Episode occurred after woke from a nap and CODE ATLAS had to be called.  Suspect 2/2 SVEN, obesity hypoventilation   -CPAP qHS; ideally will need repeat sleep study for nasal CPAP arrangement as had had a hard time tolerating the facial CPAP   -NIF - 33; not surprising considering R hemidiaphragm     XOL   -on statin     HTN  -on amlodipine     Abnormal UA - did have trace leuk est and 10-20 WBC but a few epis and bacteria were negative   -s/p Cef  -no cultures sent; unlikely UTI     Weakness   -PT. OT on board  -likely IPR; pending placement => still awaiting auth    Total time spent with patient: 15 895 [address] discussed with: Patient and Family    Discussed:  Care Plan    Prophylaxis:  Lovenox    Disposition:  Pending IPR           ___________________________________________________    Attending Physician: Meredith Wiggins MD

## 2022-03-12 NOTE — PROGRESS NOTES
Problem: Falls - Risk of  Goal: *Absence of Falls  Description: Document Naye Marking Fall Risk and appropriate interventions in the flowsheet. Outcome: Progressing Towards Goal  Note: Fall Risk Interventions:  Mobility Interventions: Bed/chair exit alarm         Medication Interventions: Patient to call before getting OOB    Elimination Interventions: Bed/chair exit alarm,Call light in reach    History of Falls Interventions: Bed/chair exit alarm         Problem: Patient Education: Go to Patient Education Activity  Goal: Patient/Family Education  Outcome: Progressing Towards Goal     Problem: Patient Education: Go to Patient Education Activity  Goal: Patient/Family Education  Outcome: Progressing Towards Goal     Problem: Nutrition Deficit  Goal: *Optimize nutritional status  Outcome: Progressing Towards Goal    The patient is a 80 y.o male that was admitted on the 6th with confusion due to hypoxia. He had multiple falls prior to his admission. The POC includes, pain management,  wound care, prevention of skin breakdown, ambulation , telemetry monitor and weaning oxygen to base 3 lpm, which is his baseline. Patient is aware of the plan and is resting in bed with no apparent distress noted.

## 2022-03-12 NOTE — PROGRESS NOTES
Spiritual care assessment with Mr Eder Marrero on 4M0 Med Surg unit. He is sitting in chair being visited by his wife Home Sauer. They both warmly welcome  and shared much life review. They have a 2 adult children here in [place]. They moved up from [facility] and attended a [employer]. They expressed being Bahai in their renee. Mr Chelsey Darby expressed feeling good about care plan and looking forward to working the plan happily. Wonderful family support. They both expressed much appreciation for the care and support and welcome prayer.      Chaplain Carolina Kramer M.Div.  Therese Gale ([id number])

## 2022-03-12 NOTE — PROGRESS NOTES
Barrera Wu [facility]  [address] [facility], [address] [place] [address]  [phone]      Medical Progress Note      NAME: Daniel Gomes   :  1940  MRM:  [id number]    Date/Time: 3/12/2022  8:05 PM         Subjective:     Chief Complaint:  \"I'm okay\"     Pt seen and examined. Awaiting IPR placement. Feeling well. Down to 2L O2 . He tells me he was non compliant but says he will try CPAP after sleep study    ROS:  (bold if positive, if negative)           Objective:       Vitals:          Last 24hrs VS reviewed since prior progress note.  Most recent are:    Visit Vitals  /67 (BP 1 Location: Right upper arm, BP Patient Position: At rest)   Pulse 99   Temp 98 °F (36.7 °C)   Resp 18   Ht 6' 1\" (1.854 m)   Wt 97.9 kg (215 lb 12.8 oz)   SpO2 91%   BMI 28.47 kg/m²     SpO2 Readings from Last 6 Encounters:   22 91%    O2 Flow Rate (L/min): 3 l/min     No intake or output data in the 24 hours ending 22 1019       Exam:     Physical Exam:    Gen:  Well-developed, well-nourished, in no acute distress  HEENT:  Pink conjunctivae, PERRL, hearing intact to voice, moist mucous membranes  Neck:  Supple, without masses, thyroid non-tender  Resp:  No accessory muscle use, clear breath sounds without wheezes rales or rhonchi  Card:  No murmurs, normal S1, S2 without thrills, bruits or peripheral edema  Abd:  Soft, non-tender, non-distended, normoactive bowel sounds are present  Musc:  No cyanosis or clubbing  Skin:  No rashes or ulcers, skin turgor is good  Neuro:  Cranial nerves 3-12 are grossly intact,  strength is 5/5 bilaterally and dorsi / plantarflexion is 5/5 bilaterally, follows commands appropriately  Psych:  Good insight, oriented to person, place and time, alert    Medications Reviewed: (see below)    Lab Data Reviewed: (see below)    ______________________________________________________________________    Medications:     Current Facility-Administered Medications   Medication Dose Route Frequency    miconazole (MICOTIN) 2 % powder   Topical BID    bacitracin zinc (BACITRACIN) 500 unit/gram ointment   Topical BID    amLODIPine (NORVASC) tablet 10 mg  10 mg Oral QHS    gabapentin (NEURONTIN) capsule 300 mg  300 mg Oral BID    atorvastatin (LIPITOR) tablet 10 mg  10 mg Oral QHS    albuterol-ipratropium (DUO-NEB) 2.5 MG-0.5 MG/3 ML  3 mL Nebulization Q4H PRN    hydrALAZINE (APRESOLINE) 20 mg/mL injection 10 mg  10 mg IntraVENous Q6H PRN    guaiFENesin ER (MUCINEX) tablet 600 mg  600 mg Oral Q12H    oxyCODONE IR (ROXICODONE) tablet 5 mg  5 mg Oral Q6H PRN    sodium chloride (NS) flush 5-40 mL  5-40 mL IntraVENous Q8H    sodium chloride (NS) flush 5-40 mL  5-40 mL IntraVENous PRN    acetaminophen (TYLENOL) tablet 650 mg  650 mg Oral Q6H PRN    Or    acetaminophen (TYLENOL) suppository 650 mg  650 mg Rectal Q6H PRN    polyethylene glycol (MIRALAX) packet 17 g  17 g Oral DAILY PRN    ondansetron (ZOFRAN ODT) tablet 4 mg  4 mg Oral Q8H PRN    Or    ondansetron (ZOFRAN) injection 4 mg  4 mg IntraVENous Q6H PRN    heparin (porcine) injection 5,000 Units  5,000 Units SubCUTAneous Q8H    melatonin tablet 3 mg  3 mg Oral QHS            Lab Review:     No results for input(s): WBC, HGB, HCT, PLT, HGBEXT, HCTEXT, PLTEXT, HGBEXT, HCTEXT, PLTEXT in the last 72 hours. No results for input(s): NA, K, CL, CO2, GLU, BUN, CREA, CA, MG, PHOS, ALB, TBIL, ALT, INR, INREXT, INREXT in the last 72 hours. No lab exists for component: SGOT  No components found for: [place]         Assessment / Plan:   Hypoxia - unclear etiology. ?2/2 R hemidiaphragm elevation. ?COPD though NOT acute exacerbation, ?post COVID etiology   -proBNP not elevated   -VRP/COVID negative   -CPAP qHS   -continue O2; wean as able   -on prednisone   -pulm on board add [place]  -outpt PFT's once improved     AMS - likely 2/2 hypercarbia. Episode occurred after woke from a nap and CODE ATLAS had to be called.  Suspect 2/2 SVEN, obesity hypoventilation   -CPAP qHS   -NIF - 33; not surprising considering R hemidiaphragm     XOL   -on statin     HTN  -on amlodipine     Abnormal UA - did have trace leuk est and 10-20 WBC but a few epis and bacteria were negative   -s/p Cef  -no cultures sent; unlikely UTI     Weakness   -PT. OT on board  -likely IPR; pending placement     Total time spent with patient: 28 [address] discussed with: Patient and Family    Discussed:  Care Plan    Prophylaxis:  Lovenox    Disposition:  Pending IPR           ___________________________________________________    Attending Physician: Catrina Rodriguez MD

## 2022-03-13 PROCEDURE — 94761 N-INVAS EAR/PLS OXIMETRY MLT: CPT

## 2022-03-13 PROCEDURE — 74011250637 HC RX REV CODE- 250/637: Performed by: INTERNAL MEDICINE

## 2022-03-13 PROCEDURE — 74011000250 HC RX REV CODE- 250: Performed by: INTERNAL MEDICINE

## 2022-03-13 PROCEDURE — 77010033678 HC OXYGEN DAILY

## 2022-03-13 PROCEDURE — 74011250636 HC RX REV CODE- 250/636: Performed by: INTERNAL MEDICINE

## 2022-03-13 PROCEDURE — 97530 THERAPEUTIC ACTIVITIES: CPT

## 2022-03-13 PROCEDURE — 97116 GAIT TRAINING THERAPY: CPT

## 2022-03-13 PROCEDURE — 94640 AIRWAY INHALATION TREATMENT: CPT

## 2022-03-13 PROCEDURE — 65660000000 HC RM CCU STEPDOWN

## 2022-03-13 PROCEDURE — 74011250637 HC RX REV CODE- 250/637: Performed by: FAMILY MEDICINE

## 2022-03-13 PROCEDURE — 74011000250 HC RX REV CODE- 250: Performed by: HOSPITALIST

## 2022-03-13 RX ADMIN — GABAPENTIN 300 MG: 300 CAPSULE ORAL at 09:38

## 2022-03-13 RX ADMIN — GUAIFENESIN 600 MG: 600 TABLET ORAL at 21:38

## 2022-03-13 RX ADMIN — MICONAZOLE NITRATE 2 % TOPICAL POWDER: at 09:40

## 2022-03-13 RX ADMIN — BACITRACIN ZINC: 500 OINTMENT TOPICAL at 18:00

## 2022-03-13 RX ADMIN — OXYCODONE 5 MG: 5 TABLET ORAL at 21:40

## 2022-03-13 RX ADMIN — SODIUM CHLORIDE, PRESERVATIVE FREE 10 ML: 5 INJECTION INTRAVENOUS at 14:03

## 2022-03-13 RX ADMIN — HEPARIN SODIUM 5000 UNITS: 5000 INJECTION INTRAVENOUS; SUBCUTANEOUS at 21:41

## 2022-03-13 RX ADMIN — Medication 3 MG: at 21:37

## 2022-03-13 RX ADMIN — GABAPENTIN 300 MG: 300 CAPSULE ORAL at 17:15

## 2022-03-13 RX ADMIN — IPRATROPIUM BROMIDE AND ALBUTEROL SULFATE 3 ML: .5; 3 SOLUTION RESPIRATORY (INHALATION) at 08:16

## 2022-03-13 RX ADMIN — MICONAZOLE NITRATE 2 % TOPICAL POWDER: at 17:15

## 2022-03-13 RX ADMIN — HEPARIN SODIUM 5000 UNITS: 5000 INJECTION INTRAVENOUS; SUBCUTANEOUS at 14:01

## 2022-03-13 RX ADMIN — OXYCODONE 5 MG: 5 TABLET ORAL at 06:47

## 2022-03-13 RX ADMIN — SODIUM CHLORIDE, PRESERVATIVE FREE 10 ML: 5 INJECTION INTRAVENOUS at 21:41

## 2022-03-13 RX ADMIN — ATORVASTATIN CALCIUM 10 MG: 10 TABLET, FILM COATED ORAL at 21:38

## 2022-03-13 RX ADMIN — SODIUM CHLORIDE, PRESERVATIVE FREE 10 ML: 5 INJECTION INTRAVENOUS at 06:48

## 2022-03-13 RX ADMIN — HEPARIN SODIUM 5000 UNITS: 5000 INJECTION INTRAVENOUS; SUBCUTANEOUS at 06:47

## 2022-03-13 RX ADMIN — ARFORMOTEROL TARTRATE 15 MCG: 15 SOLUTION RESPIRATORY (INHALATION) at 08:16

## 2022-03-13 RX ADMIN — ARFORMOTEROL TARTRATE 15 MCG: 15 SOLUTION RESPIRATORY (INHALATION) at 20:58

## 2022-03-13 RX ADMIN — GUAIFENESIN 600 MG: 600 TABLET ORAL at 09:38

## 2022-03-13 RX ADMIN — BACITRACIN ZINC: 500 OINTMENT TOPICAL at 09:00

## 2022-03-13 NOTE — PROGRESS NOTES
Bedside shift change report given to [address] [place] [address] (oncoming nurse) by Julisa Gage RN (offgoing nurse). Report included the following information SBAR, Kardex, MAR and Recent Results.

## 2022-03-13 NOTE — PROGRESS NOTES
Problem: Mobility Impaired (Adult and Pediatric)  Goal: *Acute Goals and Plan of Care (Insert Text)  Description: FUNCTIONAL STATUS PRIOR TO ADMISSION: Patient was modified independent using a single point cane or rollator for functional mobility. Multiple falls in past year. 3-4 L home O2 during the day. HOME SUPPORT PRIOR TO ADMISSION: The patient lived with wife but did not require assist.  Called 911 with falls. Son in area    Physical Therapy Goals  Initiated 3/7/2022  1. Patient will move from supine to sit and sit to supine  in bed with modified independence within 7 day(s). 2.  Patient will transfer from bed to chair and chair to bed with minimal assistance/contact guard assist using the least restrictive device within 7 day(s). 3.  Patient will perform sit to stand with minimal assistance/contact guard assist within 7 day(s). 4.  Patient will ambulate with minimal assistance/contact guard assist for 25 feet with the least restrictive device within 7 day(s). Outcome: Progressing Towards Goal  Note:   PHYSICAL THERAPY TREATMENT  Patient: Noemi Navas (37 y.o. male)  Date: 3/13/2022  Diagnosis: Acute respiratory failure with hypoxia ([address] [J96.01]  SIRS (systemic inflammatory response syndrome) ([facility] within [facility]) [R65.10] <principal problem not specified>       Precautions: Fall  Chart, physical therapy assessment, plan of care and goals were reviewed. ASSESSMENT  Patient continues with skilled PT services and is progressing towards goals. Desat 85% on room air in sitting with conversation. Using 2L during activity O2 sat 92-98%. Requiring CGA with use of RW denies SOB tho requires verbal cues for PLB during activity. Current Level of Function Impacting Discharge (mobility/balance): CGA    Other factors to consider for discharge:          PLAN :  Patient continues to benefit from skilled intervention to address the above impairments. Continue treatment per established plan of care.   to address goals. Recommendation for discharge: (in order for the patient to meet his/her long term goals)  Therapy 3 hours per day 5-7 days per week    This discharge recommendation:  Has been made in collaboration with the attending provider and/or case management    IF patient discharges home will need the following DME: to be determined (TBD)       SUBJECTIVE:   Patient stated i am feeling much better.     OBJECTIVE DATA SUMMARY:   Critical Behavior:  Neurologic State: Alert  Orientation Level: Oriented X4  Cognition: Appropriate decision making,Appropriate for age attention/concentration,Appropriate safety awareness  Safety/Judgement: Awareness of environment  Functional Mobility Training:  Bed Mobility:                    Transfers:  Sit to Stand: Contact guard assistance;Minimum assistance;Assist x1;Additional time  Stand to Sit: Contact guard assistance                             Balance:  Sitting: Intact  Standing: With support  Standing - Static: Constant support  Standing - Dynamic : Fair  Ambulation/Gait Training:  Distance (ft): 100 Feet (ft)  Assistive Device: Walker, rolling;Gait belt  Ambulation - Level of Assistance: Contact guard assistance        Gait Abnormalities: Decreased step clearance        Base of Support: Narrowed     Speed/Amber: Pace decreased (<100 feet/min)                       Stairs: Therapeutic Exercises:     Pain Rating:  Denies pain     Activity Tolerance:   Good and desaturates with exertion and requires oxygen    After treatment patient left in no apparent distress:   Sitting in chair, Call bell within reach, and Caregiver / family present    COMMUNICATION/COLLABORATION:   The patients plan of care was discussed with: Registered nurseRed Mayes   Time Calculation: 23 mins

## 2022-03-13 NOTE — PROGRESS NOTES
[address] [place] [address]  [phone]      Medical Progress Note      NAME: Trevor Linder   :  1940  MRM:  [id number]    Date/Time: 3/13/2022  8:05 PM         Subjective:     Chief Complaint:  Nilda Piper and Dyke Silence. Sitting in chair    Pt seen and examined. Awaiting IPR placement. Feeling well. Down to 2L O2 . He tells me he was non compliant but says he will try CPAP after sleep study. No issues with breathing. ROS:  (bold if positive, if negative)           Objective:       Vitals:          Last 24hrs VS reviewed since prior progress note.  Most recent are:    Visit Vitals  /81 (BP 1 Location: Right upper arm, BP Patient Position: At rest)   Pulse (!) 107   Temp 98.4 °F (36.9 °C)   Resp 17   Ht 6' 1\" (1.854 m)   Wt 97.9 kg (215 lb 12.8 oz)   SpO2 91%   BMI 28.47 kg/m²     SpO2 Readings from Last 6 Encounters:   22 91%    O2 Flow Rate (L/min): 3 l/min     No intake or output data in the 24 hours ending 22 0718       Exam:     Physical Exam:    Gen:  Well-developed, well-nourished, in no acute distress  HEENT:  Pink conjunctivae, PERRL, hearing intact to voice, moist mucous membranes  Neck:  Supple, without masses, thyroid non-tender  Resp:  No accessory muscle use, clear breath sounds without wheezes rales or rhonchi  Card:  No murmurs, normal S1, S2 without thrills, bruits or peripheral edema  Abd:  Soft, non-tender, non-distended, normoactive bowel sounds are present  Musc:  No cyanosis or clubbing  Skin:  No rashes or ulcers, skin turgor is good  Neuro:  Cranial nerves 3-12 are grossly intact,  strength is 5/5 bilaterally and dorsi / plantarflexion is 5/5 bilaterally, follows commands appropriately  Psych:  Good insight, oriented to person, place and time, alert    Medications Reviewed: (see below)    Lab Data Reviewed: (see below)    ______________________________________________________________________    Medications: Current Facility-Administered Medications   Medication Dose Route Frequency    arformoteroL (BROVANA) neb solution 15 mcg  15 mcg Nebulization BID RT    miconazole (MICOTIN) 2 % powder   Topical BID    bacitracin zinc (BACITRACIN) 500 unit/gram ointment   Topical BID    amLODIPine (NORVASC) tablet 10 mg  10 mg Oral QHS    gabapentin (NEURONTIN) capsule 300 mg  300 mg Oral BID    atorvastatin (LIPITOR) tablet 10 mg  10 mg Oral QHS    albuterol-ipratropium (DUO-NEB) 2.5 MG-0.5 MG/3 ML  3 mL Nebulization Q4H PRN    hydrALAZINE (APRESOLINE) 20 mg/mL injection 10 mg  10 mg IntraVENous Q6H PRN    guaiFENesin ER (MUCINEX) tablet 600 mg  600 mg Oral Q12H    oxyCODONE IR (ROXICODONE) tablet 5 mg  5 mg Oral Q6H PRN    sodium chloride (NS) flush 5-40 mL  5-40 mL IntraVENous Q8H    sodium chloride (NS) flush 5-40 mL  5-40 mL IntraVENous PRN    acetaminophen (TYLENOL) tablet 650 mg  650 mg Oral Q6H PRN    Or    acetaminophen (TYLENOL) suppository 650 mg  650 mg Rectal Q6H PRN    polyethylene glycol (MIRALAX) packet 17 g  17 g Oral DAILY PRN    ondansetron (ZOFRAN ODT) tablet 4 mg  4 mg Oral Q8H PRN    Or    ondansetron (ZOFRAN) injection 4 mg  4 mg IntraVENous Q6H PRN    heparin (porcine) injection 5,000 Units  5,000 Units SubCUTAneous Q8H    melatonin tablet 3 mg  3 mg Oral QHS            Lab Review:     No results for input(s): WBC, HGB, HCT, PLT, HGBEXT, HCTEXT, PLTEXT, HGBEXT, HCTEXT, PLTEXT in the last 72 hours. No results for input(s): NA, K, CL, CO2, GLU, BUN, CREA, CA, MG, PHOS, ALB, TBIL, ALT, INR, INREXT, INREXT in the last 72 hours. No lab exists for component: SGOT  No components found for: [place]         Assessment / Plan:   Hypoxia - unclear etiology. ?2/2 R hemidiaphragm elevation.  ?COPD though NOT acute exacerbation, ?post COVID etiology   -proBNP not elevated   -VRP/COVID negative   -CPAP qHS   -continue O2; wean as able   -on prednisone   -pulm on board add [place]  -outpt PFT's once improved     AMS - likely 2/2 hypercarbia. Episode occurred after woke from a nap and CODE ATLAS had to be called. Suspect 2/2 SVEN, obesity hypoventilation   -CPAP qHS   -NIF - 33; not surprising considering R hemidiaphragm     XOL   -on statin     HTN  -on amlodipine     Abnormal UA - did have trace leuk est and 10-20 WBC but a few epis and bacteria were negative   -s/p Cef  -no cultures sent; unlikely UTI     Weakness   -PT. OT on board  -likely IPR; pending placement     Total time spent with patient: 28 895 [address] discussed with: Patient and Family    Discussed:  Care Plan    Prophylaxis:  Lovenox    Disposition:  Pending IPR           ___________________________________________________    Attending Physician: Shena Perera MD

## 2022-03-14 LAB
ANION GAP SERPL CALC-SCNC: 3 MMOL/L (ref 5–15)
BUN SERPL-MCNC: 25 MG/DL (ref 6–20)
BUN/CREAT SERPL: 23 (ref 12–20)
CALCIUM SERPL-MCNC: 10 MG/DL (ref 8.5–10.1)
CHLORIDE SERPL-SCNC: 96 MMOL/L (ref 97–108)
CO2 SERPL-SCNC: 35 MMOL/L (ref 21–32)
CREAT SERPL-MCNC: 1.11 MG/DL (ref 0.7–1.3)
ERYTHROCYTE [DISTWIDTH] IN BLOOD BY AUTOMATED COUNT: 13 % (ref 11.5–14.5)
GLUCOSE SERPL-MCNC: 115 MG/DL (ref 65–100)
HCT VFR BLD AUTO: 45.4 % (ref 36.6–50.3)
HGB BLD-MCNC: 15 G/DL (ref 12.1–17)
MCH RBC QN AUTO: 30.9 PG (ref 26–34)
MCHC RBC AUTO-ENTMCNC: 33 G/DL (ref 30–36.5)
MCV RBC AUTO: 93.6 FL (ref 80–99)
NRBC # BLD: 0 K/UL (ref 0–0.01)
NRBC BLD-RTO: 0 PER 100 WBC
PLATELET # BLD AUTO: 275 K/UL (ref 150–400)
PMV BLD AUTO: 9.7 FL (ref 8.9–12.9)
POTASSIUM SERPL-SCNC: 4.1 MMOL/L (ref 3.5–5.1)
RBC # BLD AUTO: 4.85 M/UL (ref 4.1–5.7)
SODIUM SERPL-SCNC: 134 MMOL/L (ref 136–145)
WBC # BLD AUTO: 9.6 K/UL (ref 4.1–11.1)

## 2022-03-14 PROCEDURE — 97116 GAIT TRAINING THERAPY: CPT

## 2022-03-14 PROCEDURE — 94761 N-INVAS EAR/PLS OXIMETRY MLT: CPT

## 2022-03-14 PROCEDURE — 85027 COMPLETE CBC AUTOMATED: CPT

## 2022-03-14 PROCEDURE — 65660000000 HC RM CCU STEPDOWN

## 2022-03-14 PROCEDURE — 74011250637 HC RX REV CODE- 250/637: Performed by: INTERNAL MEDICINE

## 2022-03-14 PROCEDURE — 97530 THERAPEUTIC ACTIVITIES: CPT

## 2022-03-14 PROCEDURE — 77010033678 HC OXYGEN DAILY

## 2022-03-14 PROCEDURE — 36415 COLL VENOUS BLD VENIPUNCTURE: CPT

## 2022-03-14 PROCEDURE — 80048 BASIC METABOLIC PNL TOTAL CA: CPT

## 2022-03-14 PROCEDURE — 74011250637 HC RX REV CODE- 250/637: Performed by: FAMILY MEDICINE

## 2022-03-14 PROCEDURE — 94640 AIRWAY INHALATION TREATMENT: CPT

## 2022-03-14 PROCEDURE — 74011000250 HC RX REV CODE- 250: Performed by: INTERNAL MEDICINE

## 2022-03-14 PROCEDURE — 74011250636 HC RX REV CODE- 250/636: Performed by: INTERNAL MEDICINE

## 2022-03-14 PROCEDURE — 74011000250 HC RX REV CODE- 250: Performed by: HOSPITALIST

## 2022-03-14 RX ADMIN — OXYCODONE 5 MG: 5 TABLET ORAL at 23:03

## 2022-03-14 RX ADMIN — ATORVASTATIN CALCIUM 10 MG: 10 TABLET, FILM COATED ORAL at 22:27

## 2022-03-14 RX ADMIN — ARFORMOTEROL TARTRATE 15 MCG: 15 SOLUTION RESPIRATORY (INHALATION) at 07:25

## 2022-03-14 RX ADMIN — SODIUM CHLORIDE, PRESERVATIVE FREE 10 ML: 5 INJECTION INTRAVENOUS at 06:01

## 2022-03-14 RX ADMIN — Medication 3 MG: at 23:03

## 2022-03-14 RX ADMIN — BACITRACIN ZINC: 500 OINTMENT TOPICAL at 18:00

## 2022-03-14 RX ADMIN — GABAPENTIN 300 MG: 300 CAPSULE ORAL at 10:22

## 2022-03-14 RX ADMIN — HEPARIN SODIUM 5000 UNITS: 5000 INJECTION INTRAVENOUS; SUBCUTANEOUS at 06:01

## 2022-03-14 RX ADMIN — OXYCODONE 5 MG: 5 TABLET ORAL at 16:49

## 2022-03-14 RX ADMIN — OXYCODONE 5 MG: 5 TABLET ORAL at 04:52

## 2022-03-14 RX ADMIN — MICONAZOLE NITRATE 2 % TOPICAL POWDER: at 10:21

## 2022-03-14 RX ADMIN — GUAIFENESIN 600 MG: 600 TABLET ORAL at 22:27

## 2022-03-14 RX ADMIN — AMLODIPINE BESYLATE 10 MG: 5 TABLET ORAL at 22:27

## 2022-03-14 RX ADMIN — GUAIFENESIN 600 MG: 600 TABLET ORAL at 10:22

## 2022-03-14 RX ADMIN — SODIUM CHLORIDE, PRESERVATIVE FREE 10 ML: 5 INJECTION INTRAVENOUS at 15:20

## 2022-03-14 RX ADMIN — HEPARIN SODIUM 5000 UNITS: 5000 INJECTION INTRAVENOUS; SUBCUTANEOUS at 15:20

## 2022-03-14 RX ADMIN — BACITRACIN ZINC: 500 OINTMENT TOPICAL at 09:00

## 2022-03-14 NOTE — PROGRESS NOTES
Physician Progress Note      Ted CARRION #:                  [id number]  :                       1940  ADMIT DATE:       3/6/2022 4:20 PM  [address] [place] DATE:  RESPONDING  PROVIDER #:        Wendy Bowling MD          QUERY TEXT:    Pt admitted with respiratory failure. Pt noted to have confusion . If possible, please document in the progress notes and discharge summary if you are evaluating and / or treating any of the following: The medical record reflects the following:  Risk Factors: AMS  Clinical Indicators:  DCS  AMS - likely 2/2 hypercarbia. Episode occurred after woke from a nap and CODE ATLAS had to be called. Suspect 2/2 SVEN, obesity hypoventilation. Continue CPAP qHS  Acute on chronic respiratory failure with hypoxia/ hypercarbia        Treatment: supplemental oxygen @ 2-3 lts/min, code atlas, high fall risk precautions    Thank you,  Jia Gates RN, CDI, [facility], CCDS  Certified Clinical   [phone]  Options provided:  -- Anoxic encephalopathy  -- Hypoxic  encephalopathy  -- Hypertensive encephalopathy  -- Metabolic encephalopathy  -- Delirium due to, Please specify cause. -- Delirium  -- Other - I will add my own diagnosis  -- Disagree - Not applicable / Not valid  -- Disagree - Clinically unable to determine / Unknown  -- Refer to Clinical Documentation Reviewer    PROVIDER RESPONSE TEXT:    AMS due to hypercarbia.     Query created by: Philly Yanez on 3/14/2022 1:38 PM      Electronically signed by:  Wendy Bowling MD 3/14/2022 1:42 PM

## 2022-03-14 NOTE — DISCHARGE SUMMARY
Hospitalist Discharge Summary     Patient ID:    Simon Hightower  [id number]  80 y.o.  1940    Admit date of service: 3/6/2022    Discharge date of service: 3/14/2022    Admission Diagnoses: Acute respiratory failure with hypoxia ([facility] [J96.01]  SIRS (systemic inflammatory response syndrome) [facility] [R65.10]    Chronic Diagnoses:    Problem List as of 3/14/2022 Never Reviewed          Codes Class Noted - Resolved    SIRS (systemic inflammatory response syndrome) ([facility] ICD-10-CM: R65.10  ICD-9-CM: 995.90  3/6/2022 - Present        Acute respiratory failure with hypoxia [facility]) ICD-10-CM: J96.01  ICD-9-CM: 518.81  3/6/2022 - Present              Discharge Medications:   Current Discharge Medication List      CONTINUE these medications which have NOT CHANGED    Details   albuterol (PROVENTIL HFA, VENTOLIN HFA, PROAIR HFA) 90 mcg/actuation inhaler Take 2 Puffs by inhalation every four (4) hours as needed for Wheezing. amLODIPine (NORVASC) 10 mg tablet Take 10 mg by mouth nightly.      gabapentin (NEURONTIN) 300 mg capsule Take 300 mg by mouth two (2) times a day. levocetirizine (XYZAL) 5 mg tablet Take 5 mg by mouth nightly. simvastatin (ZOCOR) 20 mg tablet Take 20 mg by mouth nightly. STOP taking these medications       benazepriL (LOTENSIN) 20 mg tablet Comments:   Reason for Stopping:         diclofenac EC (VOLTAREN) 75 mg EC tablet Comments:   Reason for Stopping: Follow up Care:    1. KAITLIN Nascimento in 1-2 weeks  2. Diet:  Cardiac Diet    Disposition:  Home. Advanced Directive:    Discharge Exam:  See today's note.     CONSULTATIONS: Pulmonary/Intensive care    Significant Diagnostic Studies:   Recent Labs     03/14/22 0436   WBC 9.6   HGB 15.0   HCT 45.4        Recent Labs     03/14/22 0436   *   K 4.1   CL 96*   CO2 35*   BUN 25*   CREA 1.11   *   CA 10.0     No results for input(s): ALT, AP, TBIL, TBILI, TP, ALB, GLOB, GGT, AML, LPSE in the last 72 hours. No lab exists for component: SGOT, GPT, AMYP, HLPSE  No results for input(s): INR, PTP, APTT, INREXT in the last 72 hours. No results for input(s): FE, TIBC, PSAT, FERR in the last 72 hours. No results for input(s): PH, PCO2, PO2 in the last 72 hours. No results for input(s): CPK, CKMB in the last 72 hours. No lab exists for component: TROPONINI  No results found for: [place] [address]:   1.  Acute on chronic respiratory failure with hypoxia/ hypercarbia.  unclear etiology. ?2/2 R hemidiaphragm elevation. ? post COVID complication. VRP/COVID negative. Continue supplement O2 to keep SAO2 > 90%, CPAP qHS. Evaluated by pulmonary. outpt PFT's and sleep study once improved      2.  AMS - likely 2/2 hypercarbia. Episode occurred after woke from a nap and [employer] had to be called. Suspect 2/2 SVEN, obesity hypoventilation. Continue CPAP qHS      3.  Hyperlipidemia. on statin      4.  HTN. on amlodipine      5.  Weakness-PT. OT on board  Insurance denies IPR          Discharged in stable condition.     Spent 35 minutes    Signed:  Debby Jacobo MD  3/14/2022  10:51 AM

## 2022-03-14 NOTE — PROGRESS NOTES
Hospital Follow Up with PCP Senia Velázquez for 03/18/2022 at 2:00pm. Scheduling requesting discharge summary be faxed to [phone]

## 2022-03-14 NOTE — PROGRESS NOTES
Problem: Falls - Risk of  Goal: *Absence of Falls  Description: Document Sofia Fothergill Fall Risk and appropriate interventions in the flowsheet. Outcome: Resolved/Met  Note: Fall Risk Interventions:  Mobility Interventions: Communicate number of staff needed for ambulation/transfer,OT consult for ADLs,Patient to call before getting OOB,PT Consult for mobility concerns,PT Consult for assist device competence,Strengthening exercises (ROM-active/passive),Utilize walker, cane, or other assistive device         Medication Interventions: Evaluate medications/consider consulting pharmacy,Patient to call before getting OOB,Teach patient to arise slowly    Elimination Interventions: Call light in reach,Elevated toilet seat,Patient to call for help with toileting needs,Stay With Me (per policy),Toilet paper/wipes in reach,Toileting schedule/hourly rounds,Urinal in reach    History of Falls Interventions: Bed/chair exit alarm,Consult care management for discharge planning,Door open when patient unattended,Evaluate medications/consider consulting pharmacy,Investigate reason for fall,Utilize gait belt for transfer/ambulation,Room close to nurse's station         Problem: Patient Education: Go to Patient Education Activity  Goal: Patient/Family Education  Outcome: Resolved/Met     Problem: Patient Education: Go to Patient Education Activity  Goal: Patient/Family Education  Outcome: Resolved/Met     Problem: Patient Education: Go to Patient Education Activity  Goal: Patient/Family Education  Outcome: Resolved/Met     Problem: Nutrition Deficit  Goal: *Optimize nutritional status  Outcome: Resolved/Met     Problem: Gas Exchange - Impaired  Goal: *Absence of hypoxia  Outcome: Resolved/Met     Problem: Pressure Injury - Risk of  Goal: *Prevention of pressure injury  Description: Document Lavelle Scale and appropriate interventions in the flowsheet.   Outcome: Resolved/Met  Note: Pressure Injury Interventions:  Sensory Interventions: Assess changes in LOC    Moisture Interventions: Minimize layers    Activity Interventions: Assess need for specialty bed,Increase time out of bed,Pressure redistribution bed/mattress(bed type),PT/OT evaluation    Mobility Interventions: Assess need for specialty bed,HOB 30 degrees or less,Pressure redistribution bed/mattress (bed type),PT/OT evaluation,Turn and reposition approx.  every two hours(pillow and wedges)    Nutrition Interventions: Document food/fluid/supplement intake,Offer support with meals,snacks and hydration                     Problem: Patient Education: Go to Patient Education Activity  Goal: Patient/Family Education  Outcome: Resolved/Met     Problem: General Medical Care Plan  Goal: *Vital signs within specified parameters  Outcome: Resolved/Met  Goal: *Labs within defined limits  Outcome: Resolved/Met  Goal: *Absence of infection signs and symptoms  Outcome: Resolved/Met  Goal: *Optimal pain control at patient's stated goal  Outcome: Resolved/Met  Goal: *Skin integrity maintained  Outcome: Resolved/Met  Goal: *Fluid volume balance  Outcome: Resolved/Met  Goal: *Optimize nutritional status  Outcome: Resolved/Met  Goal: *Anxiety reduced or absent  Outcome: Resolved/Met  Goal: *Progressive mobility and function (eg: ADL's)  Outcome: Resolved/Met     Problem: Patient Education: Go to Patient Education Activity  Goal: Patient/Family Education  Outcome: Resolved/Met

## 2022-03-14 NOTE — PROGRESS NOTES
Alhaji Mcneill at [phone] spoke with Theo Dang for follow up on delivery of portable O2 tank. Order was not sent out on their end for delivery. Order was re-submitted with a rush placed on it.

## 2022-03-14 NOTE — PROGRESS NOTES
03/14/22    Medicare pt has received, reviewed, and signed 2nd IM letter informing them of their right to appeal the discharge. Patient not able to sign provider in room left copy with spouse.

## 2022-03-14 NOTE — PROGRESS NOTES
3/14/2022   1:40 PM  CM received call back from [facility] - they are able to accept patient for PT/OT/RN services. 1:28 PM  Referral accepted by [facility] for RW.  RW delivered to patient and ticket uploaded into [employer]. CM called [facility] to inquire about [facility] referral - liaison had stepped away but CM was told she would be back shortly and respond to the referral.    12:18 PM  CM met with patient and spouse and explained that [id number] for IPR placement was denied by insurance company. Patient stated that if he goes home with home health instead, he will need a RW and portable oxygen tanks. He states that he has an oxygen concentrator through [employer] but that he gave this agency back the portable tanks they originally dropped off for him. Pt and spouse would like [facility] for [facility] - CM notes that a referral was made last week and accepted. Updated order sent via [employer] to confirm this agency is still able to service. Order for RW sent via [employer] to [facility], awaiting response.     Ignacio Noonan RN

## 2022-03-14 NOTE — PROGRESS NOTES
Problem: Mobility Impaired (Adult and Pediatric)  Goal: *Acute Goals and Plan of Care (Insert Text)  Description: FUNCTIONAL STATUS PRIOR TO ADMISSION: Patient was modified independent using a single point cane or rollator for functional mobility. Multiple falls in past year. 3-4 L home O2 during the day. HOME SUPPORT PRIOR TO ADMISSION: The patient lived with wife but did not require assist.  Called 911 with falls. Son in area    Physical Therapy Goals  Initiated 3/7/2022  1. Patient will move from supine to sit and sit to supine  in bed with modified independence within 7 day(s). 2.  Patient will transfer from bed to chair and chair to bed with minimal assistance/contact guard assist using the least restrictive device within 7 day(s). MET  3. Patient will perform sit to stand with minimal assistance/contact guard assist within 7 day(s). MET  4. Patient will ambulate with minimal assistance/contact guard assist for 25 feet with the least restrictive device within 7 day(s). MET    Physical Therapy Goals  Revised 3/14/2022  1. Patient will move from supine to sit and sit to supine  in bed with independence within 7 day(s). 2.  Patient will transfer from bed to chair and chair to bed with modified independence using the least restrictive device within 7 day(s). 3.  Patient will perform sit to stand with modified independence within 7 day(s). 4.  Patient will ambulate with modified independence for 200 feet with the least restrictive device within 7 day(s). 5.  Patient will ascend/descend 1 stairs with 0 handrail(s) with minimal assistance/contact guard assist within 7 day(s).        Outcome: Progressing Towards Goal   PHYSICAL THERAPY TREATMENT: WEEKLY REASSESSMENT  Patient: Trevor Linder (29 y.o. male)  Date: 3/14/2022  Primary Diagnosis: Acute respiratory failure with hypoxia ([facility]) [J96.01]  SIRS (systemic inflammatory response syndrome) ([facility]) [R65.10]        Precautions: Fall      ASSESSMENT  Patient continues with skilled PT services and is progressing towards goals. Patient with good participation in physical therapy and has met most of his previous goals at this time. Increased difficulty of goals as above to address. Patient tolerates walking oxymetry assessment as below and is stable on room air at rest, but requires 2L NC with activity. Discussed with patient and his spouse at length how to mimic this at home. Patient at this time requires CGA for sit <> stand transfers and SBA/supervision for ambulation with RW. Wife verbalizes ability to provide this level of support at home. Recommend HHPT upon dc. Pulse oximetry assessment   89-91% at rest on room air (if 88% or less, skip next steps)  86% while ambulating on room air  90-94% while ambulating on 2 LPM    93% at rest on room air  . Patient's progression toward goals since last assessment: patient has met most of his previous PT goals and goals have been increased in order to continue improving patient independence with functional mobility    Current Level of Function Impacting Discharge (mobility/balance): CGA/SBA    Functional Outcome Measure: The patient scored 70/100 on the barthel index outcome measure     Other factors to consider for discharge: none additional         PLAN :  Goals have been updated based on progression since last assessment. Patient continues to benefit from skilled intervention to address the above impairments. Recommendations and Planned Interventions: bed mobility training, transfer training, gait training, therapeutic exercises, edema management/control, patient and family training/education, and therapeutic activities      Frequency/Duration: Patient will be followed by physical therapy:  5 times a week to address goals.     Recommendation for discharge: (in order for the patient to meet his/her long term goals)  Physical therapy at least 2 days/week in the home AND ensure CGA with transfers and SBA with ambulation    This discharge recommendation:  Has been made in collaboration with the attending provider and/or case management    IF patient discharges home will need the following DME: portable oxygen         SUBJECTIVE:   Patient stated you are very knowledgeable thank you.     OBJECTIVE DATA SUMMARY:   Patient received seated in bedside chair, agreeable to participate in PT session. Patient was cleared by nursing to participate in PT session. HISTORY:    No past medical history on file. No past surgical history on file. Personal factors and/or comorbidities impacting plan of care: none additional    Home Situation  Home Environment: Private residence  # Steps to Enter: 0  One/Two Story Residence: Two story  Lift Chair Available: Yes  Living Alone: No  Support Systems: Spouse/Significant Other  Patient Expects to be Discharged to[de-identified] Home with home health  Current DME Used/Available at Home: Cleo Nutley, rollator  Tub or Shower Type: Shower    EXAMINATION/PRESENTATION/DECISION MAKING:   Critical Behavior:  Neurologic State: Alert  Orientation Level: Oriented X4  Cognition: Follows commands  Safety/Judgement: Awareness of environment  Hearing: Auditory  Auditory Impairment: None  Skin:  all observed intact  Edema: defer to RN notes  Range Of Motion:  AROM: Within functional limits                       Strength:    Strength: Within functional limits                    Tone & Sensation:   Tone: Normal                              Coordination:  Coordination: Within functional limits  Vision:      Functional Mobility:  Bed Mobility:           Scooting: Supervision  Transfers:  Sit to Stand: Contact guard assistance  Stand to Sit: Contact guard assistance        Bed to Chair: Contact guard assistance;Stand-by assistance              Balance:   Sitting: Intact; Without support  Standing: Impaired; Without support  Standing - Static: Good  Standing - Dynamic : Fair  Ambulation/Gait Training:  Distance (ft): 75 Feet (ft)  Assistive Device: Gait belt;Walker, rolling  Ambulation - Level of Assistance: Stand-by assistance        Gait Abnormalities: Decreased step clearance              Speed/Amber: Pace decreased (<100 feet/min)  Step Length: Right shortened;Left shortened            Functional Measure:  Barthel Index:    Bathin  Bladder: 10  Bowels: 10  Groomin  Dressin  Feeding: 10  Mobility: 10  Stairs: 5  Toilet Use: 5  Transfer (Bed to Chair and Back): 10  Total: 70/100       The Barthel ADL Index: Guidelines  1. The index should be used as a record of what a patient does, not as a record of what a patient could do. 2. The main aim is to establish degree of independence from any help, physical or verbal, however minor and for whatever reason. 3. The need for supervision renders the patient not independent. 4. A patient's performance should be established using the best available evidence. Asking the patient, friends/relatives and nurses are the usual sources, but direct observation and common sense are also important. However direct testing is not needed. 5. Usually the patient's performance over the preceding 24-48 hours is important, but occasionally longer periods will be relevant. 6. Middle categories imply that the patient supplies over 50 per cent of the effort. 7. Use of aids to be independent is allowed. Score Interpretation (from [address] [address])    Independent   60-79 Minimally independent   40-59 Partially dependent   20-39 Very dependent   <20 Totally dependent     -Christine Kirkpatrick., Barthel, D.W. (1965). Functional evaluation: the Barthel Index. [facility] ([address] (). The Barthel activities of daily living index: self-reporting versus actual performance in the old (> or = 75 years). Journal of [address] [place] [address] 45(7), [facility], J.JADITYA., Meghan Aly., Roberta Lang. ().  Measuring the change in disability after inpatient rehabilitation; comparison of the responsiveness of the Barthel Index and Functional [place] Measure. Journal of Neurology, Neurosurgery, and Psychiatry, 66(4), 434-283. HAIDER Choudhury, NEWTON Hale, & Isidro Tolentino M.A. (2004) Assessment of post-stroke quality of life in cost-effectiveness studies: The usefulness of the Barthel Index and the EuroQoL-5D. Quality of Life Research, 13, 427-43           Pain Rating:  Patient without reports of pain during therapy      Activity Tolerance:   Good, tolerates ADLs without rest breaks, and desaturates with exertion and requires oxygen    After treatment patient left in no apparent distress:   Sitting in chair, Call bell within reach, and Caregiver / family present    COMMUNICATION/EDUCATION:   The patients plan of care was discussed with: Physical therapist, Occupational therapist, Registered nurse, and Case management. Fall prevention education was provided and the patient/caregiver indicated understanding. and Patient/family have participated as able in goal setting and plan of care.     Thank you for this referral.  Sebastian Díaz PT, DPT   Time Calculation: 38 mins

## 2022-03-14 NOTE — PROGRESS NOTES
Problem: Patient Education: Go to Patient Education Activity  Goal: Patient/Family Education  Outcome: Progressing Towards Goal     Problem: Patient Education: Go to Patient Education Activity  Goal: Patient/Family Education  Outcome: Progressing Towards Goal     Problem: Patient Education: Go to Patient Education Activity  Goal: Patient/Family Education  Outcome: Progressing Towards Goal     Problem: Falls - Risk of  Goal: *Absence of Falls  Description: Document Celeste Litter Fall Risk and appropriate interventions in the flowsheet. Outcome: Progressing Towards Goal  Note: Fall Risk Interventions:  Mobility Interventions: Assess mobility with egress test         Medication Interventions: Patient to call before getting OOB    Elimination Interventions: Call light in reach    History of Falls Interventions: Room close to nurse's station,Door open when patient unattended         Problem: Patient Education: Go to Patient Education Activity  Goal: Patient/Family Education  Outcome: Progressing Towards Goal     Problem: Patient Education: Go to Patient Education Activity  Goal: Patient/Family Education  Outcome: Progressing Towards Goal     Problem: Patient Education: Go to Patient Education Activity  Goal: Patient/Family Education  Outcome: Progressing Towards Goal     Problem: Nutrition Deficit  Goal: *Optimize nutritional status  Outcome: Progressing Towards Goal     Problem: Gas Exchange - Impaired  Goal: *Absence of hypoxia  Outcome: Progressing Towards Goal     Problem: Pressure Injury - Risk of  Goal: *Prevention of pressure injury  Description: Document Lavelle Scale and appropriate interventions in the flowsheet. Outcome: Progressing Towards Goal  Note: Pressure Injury Interventions:  Sensory Interventions: Assess changes in LOC    Moisture Interventions: Minimize layers    Activity Interventions: Increase time out of bed    Mobility Interventions: Turn and reposition approx.  every two hours(pillow and wedges)    Nutrition Interventions: Document food/fluid/supplement intake,Offer support with meals,snacks and hydration

## 2022-03-14 NOTE — PROGRESS NOTES
3/14/2022   4:29 PM  CM spoke w/ Katie @  [facility] acknowledged receipt of order and testing, will send  w/ portable tank for DC . CM notified, nursing, pt and pt's spouse. AVS sent to [facility] in [employer] to notify of pt;s DC to home. KYLIE Bran     '  3:05 PM  O2 order and testing faxed to [facility] [phone]  KYLIE Bran       1:29 PM  CM spoke w/ PT, notified of need for oximetry prior to DC today for home KYLIE Graff       12:21 PM  Pt medically stable for DC today. P2P completed by Dr Leo Tomlin, insurance has denied IPR; pt is agreeable to DC to home w/ HH.   pt on 2L O2 at baseline, he tells us he has no portable O2 tank for DC as he returned them to his [address] [place], [place] [address]y. CM spoke w/ Nicolas Fregoso [phone] requested portable O2 tank delivery to [facility] for DC today. [facility] requires walking oximetry and new order for additional DME, CM notified nursing to place order. Pt agreeable to [facility], choice offered, letter signed for [facility]  orders sent in [employer], await response. Order for DME/RW sent to [facility] in [employer]. CM will follow and assist.   KYLIE Bran       9:48 AM  CM spoke emir/ [employer] rep [place], insurance is requesting Peer 2 Peer prior to insurance authorization. MD Tavera notified via [employer]. P2P [phone] option 5 by 2 PM today   Requires pt's name,  and insurance # [id number]  Will follow and assist.  KYLIE Bran       9:10 AM  CM sent message to [address] [place] [address] re: insurance auth.  Await response  KYLIE Bran

## 2022-03-14 NOTE — PALLIATIVE CARE DISCHARGE
The Palliative Medicine team was consulted as part of your / your loved one's care in the hospital. Our team is a supportive service; we strive to relieve suffering and improve quality of life. You identified the following goal(s) as your main focus for healthcare: Patient/Health Care Proxy Stated Goals: Rehabilitation    We reviewed advance care planning information, which includes the following:  Advance Care Planning 3/14/2022   Patient's Healthcare Decision Maker is: Legal Next of Kin   Confirm Advance Directive Yes, not on file       We reviewed / discussed your code status as: Full Code     Full Code means perform CPR in the event of cardiac arrest     [place] means do NOT perform CPR in the event of cardiac arrest     Partial Code means you have specific preferences, please discuss with your health care team     No Order means this issue was not addressed / resolved during your stay    Because of the importance of this information, we are providing you with a printed copy to share with other healthcare providers after this hospitalization is complete. If any of the above information is incomplete or incorrect, please contact the Palliative Medicine team at [phone].

## 2022-03-14 NOTE — PROGRESS NOTES
Barrera [facility]  [address] [facility], [place], [address] [place] [address]  [phone]      Medical Progress Note      NAME: Anna Baldwin   :  1940  MRM:  [id number]    Date of service: 3/14/2022  9:20 AM       Assessment and Plan:   1. Acute on chronic respiratory failure with hypoxia/ hypercarbia. unclear etiology. ?2/2 R hemidiaphragm elevation. ? post COVID complication. VRP/COVID negative. Continue supplement O2 to keep SAO2 > 90%, CPAP qHS. Evaluated by pulmonary. outpt PFT's and sleep study once improved      2. AMS - likely 2/2 hypercarbia. Episode occurred after woke from a nap and CODE ATLAS had to be called. Suspect 2/2 SVEN, obesity hypoventilation. Continue CPAP qHS      3. Hyperlipidemia. on statin      4. HTN. on amlodipine      5.  Weakness-PT. OT on board  -likely IPR             Subjective:     Chief Complaint[de-identified] Patient was seen and examined as a follow up for AHRF. Chart was reviewed. awaiting placement, feels better     ROS:  (bold if positive, if negative)    Tolerating PT  Tolerating Diet        Objective:     Last 24hrs VS reviewed since prior progress note.  Most recent are:    Visit Vitals  /64 (BP 1 Location: Right upper arm, BP Patient Position: Sitting)   Pulse 96   Temp 97.7 °F (36.5 °C)   Resp 16   Ht 6' 1\" (1.854 m)   Wt 97.9 kg (215 lb 12.8 oz)   SpO2 96%   BMI 28.47 kg/m²     SpO2 Readings from Last 6 Encounters:   22 96%    O2 Flow Rate (L/min): 2 l/min       Intake/Output Summary (Last 24 hours) at 3/14/2022 0920  Last data filed at 3/13/2022 1202  Gross per 24 hour   Intake 240 ml   Output --   Net 240 ml        Physical Exam:    Gen:  Well-developed, well-nourished, in no acute distress  HEENT:  Pink conjunctivae, PERRL, hearing intact to voice, moist mucous membranes  Neck:  Supple, without masses, thyroid non-tender  Resp:  No accessory muscle use, clear breath sounds without wheezes rales or rhonchi  Card:  No murmurs, normal S1, S2 without thrills, bruits or peripheral edema  Abd:  Soft, non-tender, non-distended, normoactive bowel sounds are present, no palpable organomegaly and no detectable hernias  Lymph:  No cervical or inguinal adenopathy  Musc:  No cyanosis or clubbing  Skin:  No rashes or ulcers, skin turgor is good  Neuro:  Cranial nerves are grossly intact, no focal motor weakness, follows commands appropriately  Psych:  Good insight, oriented to person, place and time, alert  __________________________________________________________________  Medications Reviewed: (see below)  Medications:     Current Facility-Administered Medications   Medication Dose Route Frequency    arformoteroL (BROVANA) neb solution 15 mcg  15 mcg Nebulization BID RT    miconazole (MICOTIN) 2 % powder   Topical BID    bacitracin zinc (BACITRACIN) 500 unit/gram ointment   Topical BID    amLODIPine (NORVASC) tablet 10 mg  10 mg Oral QHS    gabapentin (NEURONTIN) capsule 300 mg  300 mg Oral BID    atorvastatin (LIPITOR) tablet 10 mg  10 mg Oral QHS    albuterol-ipratropium (DUO-NEB) 2.5 MG-0.5 MG/3 ML  3 mL Nebulization Q4H PRN    hydrALAZINE (APRESOLINE) 20 mg/mL injection 10 mg  10 mg IntraVENous Q6H PRN    guaiFENesin ER (MUCINEX) tablet 600 mg  600 mg Oral Q12H    oxyCODONE IR (ROXICODONE) tablet 5 mg  5 mg Oral Q6H PRN    sodium chloride (NS) flush 5-40 mL  5-40 mL IntraVENous Q8H    sodium chloride (NS) flush 5-40 mL  5-40 mL IntraVENous PRN    acetaminophen (TYLENOL) tablet 650 mg  650 mg Oral Q6H PRN    Or    acetaminophen (TYLENOL) suppository 650 mg  650 mg Rectal Q6H PRN    polyethylene glycol (MIRALAX) packet 17 g  17 g Oral DAILY PRN    ondansetron (ZOFRAN ODT) tablet 4 mg  4 mg Oral Q8H PRN    Or    ondansetron (ZOFRAN) injection 4 mg  4 mg IntraVENous Q6H PRN    heparin (porcine) injection 5,000 Units  5,000 Units SubCUTAneous Q8H    melatonin tablet 3 mg  3 mg Oral QHS        Lab Data Reviewed: (see below)  Lab Review:     Recent Labs 03/14/22  0436   WBC 9.6   HGB 15.0   HCT 45.4        Recent Labs     03/14/22  0436   *   K 4.1   CL 96*   CO2 35*   *   BUN 25*   CREA 1.11   CA 10.0     No results found for: GLUCPOC  No results for input(s): PH, PCO2, PO2, HCO3, FIO2 in the last 72 hours. No results for input(s): INR, INREXT in the last 72 hours. All Micro Results     Procedure Component Value Units Date/Time    CULTURE, BLOOD, PAIRED [[id number]] Collected: 03/06/22 1638    Order Status: Completed Specimen: Blood Updated: 03/11/22 0543     Special Requests: NO SPECIAL REQUESTS        Culture result: NO GROWTH 5 DAYS       RESPIRATORY VIRUS PANEL W/COVID-19, PCR [[id number]] Collected: 03/08/22 1617    Order Status: Completed Specimen: Nasopharyngeal Updated: 03/08/22 2240     Adenovirus Not detected        Coronavirus 229E Not detected        Coronavirus HKU1 Not detected        Coronavirus CVNL63 Not detected        Coronavirus OC43 Not detected        SARS-CoV-2, PCR Not detected        Metapneumovirus Not detected        Rhinovirus and Enterovirus Not detected        Influenza A Not detected        Influenza A, subtype H1 Not detected        Influenza A, subtype H3 Not detected        INFLUENZA A H1N1 PCR Not detected        Influenza B Not detected        Parainfluenza 1 Not detected        Parainfluenza 2 Not detected        Parainfluenza 3 Not detected        Parainfluenza virus 4 Not detected        RSV by PCR Not detected        B. parapertussis, PCR Not detected        Bordetella pertussis - PCR Not detected        Chlamydophila pneumoniae DNA, QL, PCR Not detected        Mycoplasma pneumoniae DNA, QL, PCR Not detected       URINE CULTURE HOLD SAMPLE [[id number]] Collected: 03/06/22 1741    Order Status: Completed Specimen: Urine from Serum Updated: 03/06/22 1752     Urine culture hold       Urine on hold in Microbiology dept for 2 days.   If unpreserved urine is submitted, it cannot be used for addtional testing after 24 hours, recollection will be required. COVID-19 RAPID TEST [[id number]] Collected: 03/06/22 1638    Order Status: Completed Specimen: Nasopharyngeal Updated: 03/06/22 1706     Specimen source Nasopharyngeal        COVID-19 rapid test Not detected        Comment: Rapid Abbott ID Now       Rapid NAAT:  The specimen is NEGATIVE for SARS-CoV-2, the novel coronavirus associated with COVID-19. Negative results should be treated as presumptive and, if inconsistent with clinical signs and symptoms or necessary for patient management, should be tested with an alternative molecular assay. Negative results do not preclude SARS-CoV-2 infection and should not be used as the sole basis for patient management decisions. This test has been authorized by the FDA under an Emergency Use Authorization (EUA) for use by authorized laboratories. Fact sheet for Healthcare Providers: ConventionUpdate.co.nz  Fact sheet for Patients: ConventionUpdate.co.nz       Methodology: Isothermal Nucleic Acid Amplification               I have reviewed notes of prior 24hr. Other pertinent lab: Total time spent with patient: 22 I personally reviewed chart, notes, data and current medications in the medical record. I have personally examined and treated the patient at bedside during this period.                  Care Plan discussed with: Patient, Nursing Staff and >50% of time spent in counseling and coordination of care    Discussed:  Care Plan    Prophylaxis:  Hep SQ    Disposition:  SNF/LTC           ___________________________________________________    Attending Physician: Lupis Kowalski MD

## 2022-03-14 NOTE — DISCHARGE INSTRUCTIONS
ACUTE DIAGNOSES:  Acute respiratory failure with hypoxia (HCC) [J96.01]  SIRS (systemic inflammatory response syndrome) ([facility]) [R65.10]    CHRONIC MEDICAL DIAGNOSES:  Problem List as of 3/14/2022 Never Reviewed          Codes Class Noted - Resolved    [facility]ic [facility] response [facility] ICD-10-CM: R65.10  ICD-9-CM: 995.90  3/6/2022 - Present        Acute respiratory failure with hypoxia [facility]) ICD-10-CM: J96.01  ICD-9-CM: 518.81  3/6/2022 - Present              DISCHARGE MEDICATIONS:          · It is important that you take the medication exactly as they are prescribed. · Keep your medication in the bottles provided by the pharmacist and keep a list of the medication names, dosages, and times to be taken in your wallet. · Do not take other medications without consulting your doctor. DIET:  Cardiac Diet    ACTIVITY: Activity as tolerated    ADDITIONAL INFORMATION: If you experience any of the following symptoms then please call your primary care physician or return to the emergency room if you cannot get hold of your doctor: Fever, chills, nausea, vomiting, diarrhea, change in mentation, falling, bleeding, shortness of breath. FOLLOW UP CARE:  Dr. Abhinav Abad, [place]  you are to call and set up an appointment to see them in 5 days. Follow-up with pulmonary      Information obtained by :  I understand that if any problems occur once I am at home I am to contact my physician. I understand and acknowledge receipt of the instructions indicated above.                                                                                                                                            Physician's or R.N.'s Signature                                                                  Date/Time                                                                                                                                              Patient or Representative Signature Date/Time

## 2022-03-14 NOTE — PROGRESS NOTES
Problem: Falls - Risk of  Goal: *Absence of Falls  Description: Document Krista Magallon Fall Risk and appropriate interventions in the flowsheet. Outcome: Resolved/Met  Note: Fall Risk Interventions:  Mobility Interventions: Communicate number of staff needed for ambulation/transfer,OT consult for ADLs,Patient to call before getting OOB,PT Consult for mobility concerns,PT Consult for assist device competence,Strengthening exercises (ROM-active/passive),Utilize walker, cane, or other assistive device         Medication Interventions: Evaluate medications/consider consulting pharmacy,Patient to call before getting OOB,Teach patient to arise slowly    Elimination Interventions: Call light in reach,Elevated toilet seat,Patient to call for help with toileting needs,Stay With Me (per policy),Toilet paper/wipes in reach,Toileting schedule/hourly rounds,Urinal in reach    History of Falls Interventions: Bed/chair exit alarm,Consult care management for discharge planning,Door open when patient unattended,Evaluate medications/consider consulting pharmacy,Investigate reason for fall,Utilize gait belt for transfer/ambulation,Room close to nurse's station         Problem: Patient Education: Go to Patient Education Activity  Goal: Patient/Family Education  Outcome: Resolved/Met     Problem: Patient Education: Go to Patient Education Activity  Goal: Patient/Family Education  Outcome: Resolved/Met     Problem: Patient Education: Go to Patient Education Activity  Goal: Patient/Family Education  Outcome: Resolved/Met     Problem: Nutrition Deficit  Goal: *Optimize nutritional status  Outcome: Resolved/Met     Problem: Gas Exchange - Impaired  Goal: *Absence of hypoxia  Outcome: Resolved/Met     Problem: Pressure Injury - Risk of  Goal: *Prevention of pressure injury  Description: Document Lavelle Scale and appropriate interventions in the flowsheet.   Outcome: Resolved/Met  Note: Pressure Injury Interventions:  Sensory Interventions: Assess changes in LOC    Moisture Interventions: Minimize layers    Activity Interventions: Assess need for specialty bed,Increase time out of bed,Pressure redistribution bed/mattress(bed type),PT/OT evaluation    Mobility Interventions: Assess need for specialty bed,HOB 30 degrees or less,Pressure redistribution bed/mattress (bed type),PT/OT evaluation,Turn and reposition approx.  every two hours(pillow and wedges)    Nutrition Interventions: Document food/fluid/supplement intake,Offer support with meals,snacks and hydration                     Problem: Patient Education: Go to Patient Education Activity  Goal: Patient/Family Education  Outcome: Resolved/Met     Problem: General Medical Care Plan  Goal: *Vital signs within specified parameters  Outcome: Resolved/Met  Goal: *Labs within defined limits  Outcome: Resolved/Met  Goal: *Absence of infection signs and symptoms  Outcome: Resolved/Met  Goal: *Optimal pain control at patient's stated goal  Outcome: Resolved/Met  Goal: *Skin integrity maintained  Outcome: Resolved/Met  Goal: *Fluid volume balance  Outcome: Resolved/Met  Goal: *Optimize nutritional status  Outcome: Resolved/Met  Goal: *Anxiety reduced or absent  Outcome: Resolved/Met  Goal: *Progressive mobility and function (eg: ADL's)  Outcome: Resolved/Met     Problem: Patient Education: Go to Patient Education Activity  Goal: Patient/Family Education  Outcome: Resolved/Met

## 2022-03-15 VITALS
DIASTOLIC BLOOD PRESSURE: 79 MMHG | OXYGEN SATURATION: 93 % | RESPIRATION RATE: 18 BRPM | HEIGHT: 73 IN | BODY MASS INDEX: 28.6 KG/M2 | SYSTOLIC BLOOD PRESSURE: 134 MMHG | WEIGHT: 215.8 LBS | TEMPERATURE: 98.7 F | HEART RATE: 105 BPM

## 2022-03-15 PROCEDURE — 77010033678 HC OXYGEN DAILY

## 2022-03-15 PROCEDURE — 94640 AIRWAY INHALATION TREATMENT: CPT

## 2022-03-15 PROCEDURE — 74011250637 HC RX REV CODE- 250/637: Performed by: INTERNAL MEDICINE

## 2022-03-15 PROCEDURE — 94761 N-INVAS EAR/PLS OXIMETRY MLT: CPT

## 2022-03-15 PROCEDURE — 74011000250 HC RX REV CODE- 250: Performed by: HOSPITALIST

## 2022-03-15 RX ADMIN — ARFORMOTEROL TARTRATE 15 MCG: 15 SOLUTION RESPIRATORY (INHALATION) at 07:37

## 2022-03-15 RX ADMIN — GUAIFENESIN 600 MG: 600 TABLET ORAL at 09:31

## 2022-03-15 RX ADMIN — GABAPENTIN 300 MG: 300 CAPSULE ORAL at 09:31

## 2022-03-15 NOTE — DISCHARGE SUMMARY
Hospitalist Discharge Summary     Patient ID:    Willian [facility]  [id number]  80 y.o.  1940    Admit date of service: 3/6/2022    Discharge date of service: 3/15/2022    Admission Diagnoses: Acute respiratory failure with hypoxia ([facility] [J96.01]  [facility]systemic [facility] response [facility] [R65.10]    Chronic Diagnoses:    Problem List as of 3/15/2022 Never Reviewed          Codes Class Noted - Resolved    SIRS (systemic inflammatory response syndrome) ([facility] ICD-10-CM: R65.10  ICD-9-CM: 995.90  3/6/2022 - Present        Acute respiratory failure with hypoxia [facility]) ICD-10-CM: J96.01  ICD-9-CM: 518.81  3/6/2022 - Present              Discharge Medications:   Current Discharge Medication List      CONTINUE these medications which have NOT CHANGED    Details   albuterol (PROVENTIL HFA, VENTOLIN HFA, PROAIR HFA) 90 mcg/actuation inhaler Take 2 Puffs by inhalation every four (4) hours as needed for Wheezing. amLODIPine (NORVASC) 10 mg tablet Take 10 mg by mouth nightly.      gabapentin (NEURONTIN) 300 mg capsule Take 300 mg by mouth two (2) times a day. levocetirizine (XYZAL) 5 mg tablet Take 5 mg by mouth nightly. simvastatin (ZOCOR) 20 mg tablet Take 20 mg by mouth nightly. STOP taking these medications       benazepriL (LOTENSIN) 20 mg tablet Comments:   Reason for Stopping:         diclofenac EC (VOLTAREN) 75 mg EC tablet Comments:   Reason for Stopping: Follow up Care:    1. KAITLIN Zeng in 1-2 weeks  2. Diet:  Cardiac Diet    Disposition:  Home. Advanced Directive:    Discharge Exam:  See today's note.     CONSULTATIONS: Pulmonary/Intensive care    Significant Diagnostic Studies:   Recent Labs     03/14/22 0436   WBC 9.6   HGB 15.0   HCT 45.4        Recent Labs     03/14/22 0436   *   K 4.1   CL 96*   CO2 35*   BUN 25*   CREA 1.11   *   CA 10.0     No results for input(s): ALT, AP, TBIL, TBILI, TP, ALB, GLOB, GGT, AML, LPSE in the last 72 hours. No lab exists for component: SGOT, GPT, AMYP, HLPSE  No results for input(s): INR, PTP, APTT, INREXT, INREXT in the last 72 hours. No results for input(s): FE, TIBC, PSAT, FERR in the last 72 hours. No results for input(s): PH, PCO2, PO2 in the last 72 hours. No results for input(s): CPK, CKMB in the last 72 hours. No lab exists for component: TROPONINI  No results found for: [address]:   1.  Acute on chronic respiratory failure with hypoxia/ hypercarbia.  unclear etiology. ?2/2 R hemidiaphragm elevation. ? post COVID complication. VRP/COVID negative. Continue supplement O2 to keep SAO2 > 90%, CPAP qHS. Evaluated by pulmonary. outpt PFT's and sleep study once improved      2.  AMS - likely 2/2 hypercarbia. Episode occurred after woke from a nap and [employer] ATLAS had to be called. Suspect 2/2 SVEN, obesity hypoventilation. Continue CPAP qHS      3.  Hyperlipidemia. on statin      4.  HTN. on amlodipine      5.  Weakness-PT. OT on board  Insurance denies IPR          Discharged in stable condition.     Spent 35 minutes    Signed:  Hanley Lesches, MD  3/15/2022  10:51 AM

## 2022-03-15 NOTE — PROGRESS NOTES
Pt due to be discharged, awaiting O2 transport tank. Tank did not arrive. Pt ride went home, nursing supv aware. [address] contacted. Pt will be staying an additional night. Updated pt on status, apologized for inconvenience.

## 2022-03-15 NOTE — PROGRESS NOTES
1933- Called [facility] to follow up on delivery of portable O2 tank for patient's discharge. I spoke to Alvino who verified that order is in process but could not give me an ETA. Patient and charge nurse updated at this time. 2100- NS rounded on patient to see if O2 tank was delivered. Patient resting in chair with no complaints at this time. I discussed above with patient and encouraged him to let staff know if it gets too late for him to feel comfortable going home. [address] again and spoke to HARRIET. Verified that delivery is still \"In process\" and asked if we could get any kind of update on delivery time. Message sent to delivery team by HARRIET.  to call unit with updated ETA.    0000- Still awaiting delivery. Patient has elected to discharge in am. He does not feel safe driving home at this time.  Will notify MD.

## 2022-03-15 NOTE — PROGRESS NOTES
3/15/2022   9:56 AM  RAUL received TC from [employer] Managerden[employer] Pressley  out for delivery of pt's portable O2 between 10 AM and 1 PM,  updated nursing and pt   KYLIE Cortez       8:45 AM  · RAUL spoke w/ Jeanne España, 5th floor charge, [employer] did NOT deliver portable O2 to pt yesterday for DC , nursing called and was told O2 would be delivered. · CM again  faxed O2 order and testing to [phone], called [facility] for [place], notifying of fax and need for delivery of portable tank to pt by 11 AM  · Await response.   KYLIE Cortez

## 2023-01-25 NOTE — PROGRESS NOTES
Bedside and Verbal shift change report given to Cassie Atkinson RN (oncoming nurse) by Nany Landa RN (offgoing nurse). Report included the following information SBAR, Kardex, Intake/Output, MAR, Accordion and Recent Results. 1444 - Pt anxious/agitated pulling off gown, leads, O2, and O2 sensor refusing to let staff place back on. Pt trying to get out of bed, saying \"I am going to start swinging\" and \"I am leaving. \" Code atlas called. RNs, techs, security, and  at bedside. Family called. Pt kept sitting on side of bed w/ gait belt on until wife able to come to bedside. Wife assisted in placing O2, O2 sensor, and leads back on patient. O2 sats 90's. This patient was assisted with Intentional Toileting every 2 hours during this shift as appropriate. Documentation of ambulation and output reflected on Flowsheet as appropriate. Purposeful hourly rounding was completed using AIDET and 5Ps. Outcomes of PHR documented as they occurred. Bed alarm in use as appropriate. Dual Suction and ambubag in place. Bedside and Verbal shift change report given to Nany Landa RN (oncoming nurse) by Cassie Atkinson RN (offgoing nurse). Report included the following information SBAR, Kardex, Intake/Output, MAR, Accordion and Recent Results. Doxepin Counseling:  Patient advised that the medication is sedating and not to drive a car after taking this medication. Patient informed of potential adverse effects including but not limited to dry mouth, urinary retention, and blurry vision.  The patient verbalized understanding of the proper use and possible adverse effects of doxepin.  All of the patient's questions and concerns were addressed.